# Patient Record
Sex: MALE | Race: WHITE | NOT HISPANIC OR LATINO | Employment: UNEMPLOYED | URBAN - METROPOLITAN AREA
[De-identification: names, ages, dates, MRNs, and addresses within clinical notes are randomized per-mention and may not be internally consistent; named-entity substitution may affect disease eponyms.]

---

## 2017-03-10 ENCOUNTER — ALLSCRIPTS OFFICE VISIT (OUTPATIENT)
Dept: OTHER | Facility: OTHER | Age: 47
End: 2017-03-10

## 2017-04-21 ENCOUNTER — ALLSCRIPTS OFFICE VISIT (OUTPATIENT)
Dept: OTHER | Facility: OTHER | Age: 47
End: 2017-04-21

## 2017-05-22 ENCOUNTER — ALLSCRIPTS OFFICE VISIT (OUTPATIENT)
Dept: OTHER | Facility: OTHER | Age: 47
End: 2017-05-22

## 2017-05-22 LAB — S PYO AG THROAT QL: NEGATIVE

## 2017-05-25 LAB
CULTURE RESULT (HISTORICAL): NORMAL
MISCELLANEOUS LAB TEST RESULT (HISTORICAL): NORMAL

## 2018-01-12 NOTE — PROGRESS NOTES
Assessment    1  Current some day smoker (305 1) (F17 200)   · Smokes 1pk of cigarettes a day  2  Hyperlipidemia (272 4) (E78 5)   3  Vitamin D deficiency (268 9) (E55 9)   4  Prediabetes (790 29) (R73 03)   5  Current smoker (305 1) (F17 200)    Plan  Hyperlipidemia    · Simvastatin 40 MG Oral Tablet (Zocor); TAKE ONE TABLET BY MOUTH AT  BEDTIME  Prediabetes    · (1) HEMOGLOBIN A1C; Status:Active; Requested for:10Mar2017;    · *VB - Foot Exam; Status:Complete;   Done: 29YWS0862 09:58AM    Discussion/Summary    1  hyperlipidemia - continue with Simvastatin daily, continue to work on diet, compliance encouraged  recheck 1 year    2  prediabetic - A1C down to 5 8% GREAT WORK!! Keep up the good work we will recheck this in 6 months, if normal will check yearly    3  vitamin d def - level normal, continue with supplement daily, repeat in 1 year    4  daily smoker - work on cutting back    f/u as needed  f/u 6 months with A1C, if normal will go back to yearly visits  Possible side effects of new medications were reviewed with the patient/guardian today  The treatment plan was reviewed with the patient/guardian  The patient/guardian understands and agrees with the treatment plan      Chief Complaint  Pt presents to the office for his 6 mo ck on PreDM, and lipids  BW done 02/27/17  foot due today  eye due, need referral      History of Present Illness  Patient here to review labs  Has made major changes in his life  New job, going to the gym, eating healthy cut out all white breads/pasta ect  Feeling very healthy and well  Still smoking but working on that also  No complaints  Compliant with Simvastatin  May have missed a few doses recently but otherwise no complaints  Review of Systems    Constitutional: No fever or chills, feels well, no tiredness, no recent weight gain or weight loss  Eyes: No complaints of eye pain, no red eyes, no discharge from eyes, no itchy eyes     ENT: no complaints of earache, no hearing loss, no nosebleeds, no nasal discharge, no sore throat, no hoarseness  Cardiovascular: No complaints of slow heart rate, no fast heart rate, no chest pain, no palpitations, no leg claudication, no lower extremity  Respiratory: No complaints of shortness of breath, no wheezing, no cough, no SOB on exertion, no orthopnea or PND  Gastrointestinal: No complaints of abdominal pain, no constipation, no nausea or vomiting, no diarrhea or bloody stools  Genitourinary: No complaints of dysuria, no incontinence, no hesitancy, no nocturia, no genital lesion, no testicular pain  Musculoskeletal: No complaints of arthralgia, no myalgias, no joint swelling or stiffness, no limb pain or swelling  Integumentary: No complaints of skin rash or skin lesions, no itching, no skin wound, no dry skin  Neurological: No compliants of headache, no confusion, no convulsions, no numbness or tingling, no dizziness or fainting, no limb weakness, no difficulty walking  Psychiatric: Is not suicidal, no sleep disturbances, no anxiety or depression, no change in personality, no emotional problems  Endocrine: No complaints of proptosis, no hot flashes, no muscle weakness, no erectile dysfunction, no deepening of the voice, no feelings of weakness  Hematologic/Lymphatic: No complaints of swollen glands, no swollen glands in the neck, does not bleed easily, no easy bruising  Active Problems    1  Current smoker (305 1) (F17 200)   2  Hyperlipidemia (272 4) (E78 5)   3  Need for prophylactic vaccination and inoculation against influenza (V04 81) (Z23)   4  Prediabetes (790 29) (R73 03)   5  Taking medication for chronic disease (799 9) (R69)   6  Umbilical hernia (583 8) (K42 9)   7  Vitamin D deficiency (268 9) (E55 9)    Past Medical History    1  History of Cough (786 2) (R05)   2  History of Foot Pain (Soft Tissue) (729 5)   3  History of acute bronchitis (V12 69) (Z87 09)   4   History of hiatal hernia (V12 79) (Z87 19)   5  Need for prophylactic vaccination and inoculation against influenza (V04 81) (Z23)   6  History of Onychomycosis of toenail (110 1) (B35 1)    The active problems and past medical history were reviewed and updated today  Surgical History    1  History of Knee Surgery    The surgical history was reviewed and updated today  Family History  Mother    1  Family history of Family Health Status Of Mother - Good  Father    2  Family history of Coronary artery disease   3  Family history of Father  At Age 40    The family history was reviewed and updated today  Social History    · Denied: History of Alcohol Use (History)   · Always uses seat belt   · Caffeine use (V49 89) (F15 90)   · Current smoker (305 1) (F17 200)   · Current some day smoker (305 1) (F17 200)   · Denied: History of Drug Use   · Has smoke detectors  The social history was reviewed and updated today  The social history was reviewed and is unchanged  Current Meds   1  Simvastatin 40 MG Oral Tablet; TAKE ONE TABLET BY MOUTH AT BEDTIME; Therapy: [id number] to (Evaluate:73Qbg9086)  Requested for: 21RYY9656; Last   Rx:[id number] Ordered   2  Vitamin D3 400 UNIT Oral Capsule; TAKE 1 CAPSULE DAILY; Therapy: (Recorded:50Mtp0571) to Recorded    The medication list was reviewed and updated today  Allergies    1  No Known Drug Allergies    2  No Known Environmental Allergies   3  No Known Food Allergies    Vitals  Vital Signs    Recorded: 92EYC9021 09:56AM   Heart Rate 84, R Radial   Pulse Quality Normal, R Radial   Respiration Quality Normal   Respiration 16   Systolic 180, RUE, Sitting   Diastolic 82, RUE, Sitting   Height 5 ft 9 in   Weight 203 lb 8 0 oz   BMI Calculated 30 05   BSA Calculated 2 08     Physical Exam    Constitutional   General appearance: No acute distress, well appearing and well nourished  Pulmonary   Respiratory effort: No increased work of breathing or signs of respiratory distress  Auscultation of lungs: Clear to auscultation, equal breath sounds bilaterally, no wheezes, no rales, no rhonci  Cardiovascular   Palpation of heart: Normal PMI, no thrills  Auscultation of heart: Normal rate and rhythm, normal S1 and S2, without murmurs  Examination of extremities for edema and/or varicosities: Normal     Carotid pulses: Normal     Psychiatric   Mood and affect: Normal         Socks and shoes removed, the Right Foot: the foot was normal, no swelling, no erythema  The toes on the right were normal    The sensory exam showed  normal vibratory sensation at the level of the toes on the right  Normal tactile sensation with monofilament testing throughout the right foot  Socks and shoes removed, Left Foot: the foot was normal, no swelling, no erythema  The toes on the left were normal    The sensory exam showed  normal vibratory sensation at the level of the toes on the left  Normal tactile sensation with monofilament testing throughout the left foot  Capillary refills findings on the right were normal in the toes  Pulses:   2+ in the posterior tibialis on the right   2+ in the dorsalis pedis on the right  Capillary refills findings on the left were normal in the toes  Pulses:   2+ in the posterior tibialis on the left   2+ in the dorsalis pedis on the left  Assign Risk Category: 0: No loss of protective sensation, no deformity  No present risk  Results/Data  PHQ-2 Adult Depression Screening [id number] 09:58AM User, Ahs     Test Name Result Flag Reference   PHQ-2 Adult Depression Score 0     Over the last two weeks, how often have you been bothered by any of the following problems?   Little interest or pleasure in doing things: Not at all - 0  Feeling down, depressed, or hopeless: Not at all - 0   PHQ-2 Adult Depression Screening Negative       *VB - Foot Exam 26MJY0549 09:58AM Brenna Valadez     Test Name Result Flag Reference   FOOT EXAM [id number]       [facility]) Lipid Panel [id number] 07: 17AM Rhoda Joaquin     Test Name Result Flag Reference   Cholesterol, Total 225 mg/dL H 100-199   Triglycerides 121 mg/dL  0-149   HDL Cholesterol 61 mg/dL  >39   VLDL Cholesterol [place] 24 mg/dL  5-40   LDL Cholesterol Calc 140 mg/dL H 0-99     (1) COMPREHENSIVE METABOLIC PANEL [id number] 64:11PR Rhoda Joaquin     Test Name Result Flag Reference   Glucose, Serum 99 mg/dL  65-99   BUN 15 mg/dL  6-24   Creatinine, Serum 0 92 mg/dL  0 76-1 27   eGFR If NonAfricn Am 101 mL/min/1 73  >59   eGFR If Africn Am 117 mL/min/1 73  >59   BUN/Creatinine Ratio 16  9-20   Sodium, Serum 141 mmol/L  134-144   Potassium, Serum 4 4 mmol/L  3 5-5 2   Chloride, Serum 102 mmol/L     Carbon Dioxide, Total 24 mmol/L  18-29   Calcium, Serum 8 8 mg/dL  8 7-10 2   Protein, Total, Serum 6 2 g/dL  6 0-8 5   Albumin, Serum 4 0 g/dL  3 5-5 5   Globulin, Total 2 2 g/dL  1 5-4 5   A/G Ratio 1 8  1 1-2 5   **Effective March 13, 2017 the reference interval**                   for A/G Ratio will be changing to:                               Age                Male          Female                           0 -  7 days       1 1 - 2 3       1 1 - 2 3                           8 - 30 days       1 2 - 2 8       1 2 - 2 8                           1 -  6 months     1 3 - 3 6       1 3 - 3 6                    7 months -  5 years      1 5 - 2 6       1 5 - 2 6                              > 5 years      1 2 - 2 2       1 2 - 2 2   Bilirubin, Total 0 3 mg/dL  0 0-1 2   Alkaline Phosphatase, S 75 IU/L     AST (SGOT) 17 IU/L  0-40   ALT (SGPT) 19 IU/L  0-44     (1) CBC/PLT/DIFF [id number] 07:17AM Rhoda Joaquin     Test Name Result Flag Reference   WBC 6 5 x10E3/uL  3 4-10 8   RBC 4 96 x10E6/uL  4 14-5 80   Hemoglobin 15 2 g/dL  12 6-17 7   Hematocrit 44 6 %  37 5-51 0   MCV 90 fL  79-97   MCH 30 6 pg  26 6-33 0   MCHC 34 1 g/dL  31 5-35 7   RDW 13 9 %  12 3-15 4   Platelets 957 H68G5/RK  150-379   Neutrophils 51 %     Lymphs 35 %     Monocytes 8 % Eos 6 %     Basos 0 %     Neutrophils (Absolute) 3 3 x10E3/uL  1 4-7 0   Lymphs (Absolute) 2 3 x10E3/uL  0 7-3 1   Monocytes(Absolute) 0 5 x10E3/uL  0 1-0 9   Eos (Absolute) 0 4 x10E3/uL  0 0-0 4   Baso (Absolute) 0 0 x10E3/uL  0 0-0 2   Immature Granulocytes 0 %     Immature Grans (Abs) 0 0 x10E3/uL  0 0-0 1     (LC) TSH reflex to T4F [id number] 07:17AM Jami Powell     Test Name Result Flag Reference   TSH 1 350 uIU/mL  0 450-4 500     (1) VITAMIN D 25-HYDROXY [id number] 07:17AM Jami Powell     Test Name Result Flag Reference   Vitamin D, 25-Hydroxy 39 5 ng/mL  30 0-100 0   Vitamin D deficiency has been defined by the [address] practice guideline as a  level of serum 25-OH vitamin D less than 20 ng/mL (1,2)  The Endocrine Society went on to further define vitamin D  insufficiency as a level between 21 and 29 ng/mL (2)  1  IOM ([place] [employer])  2010  Dietary reference     intakes for calcium and D  [facility]: The     [employer]  2  Claudia MF, Katia MAYFIELD, Tito BROWN, et al      Evaluation, treatment, and prevention of vitamin D     deficiency: an Endocrine Society clinical practice     guideline  JCEM  2011 Jul; 96(7):1911-30  (LC) Hgb A1c with eAG Estimation [id number] 07:17AM Jami Powell     Test Name Result Flag Reference   Hemoglobin A1c 5 8 % H 4 8-5 6   Pre-diabetes: 5 7 - 6 4           Diabetes: >6 4           Glycemic control for adults with diabetes: <7 0   Estim  Avg Glu (eAG) 120 mg/dL       [facility]) 25-Hydroxyvitamin D [facility] D2+D3 01Oct2015 07:20AM Jami Powell     Test Name Result Flag Reference   25-Hydroxy, Vitamin D 45 ng/mL     Reference Range:   All Ages: Target levels 30 - 100   25-Hydroxy, Vitamin D-2 <1 0 ng/mL     25-Hydroxy, Vitamin D-3 44 ng/mL       Future Appointments    Date/Time Provider Specialty Site   03/14/2018 03:00 PM Jami Barge, [address] Electronically signed by : Shirley Francois [facility]; Mar 10 2017 11:54AM EST                       (Author)    Electronically signed by : Shirley Francois [facility]; Mar 10 2017 11:55AM EST                       (Author)    Electronically signed by : VANE Maxwell ; Mar 10 2017 12:14PM EST                       (Author)

## 2018-01-13 VITALS
HEART RATE: 80 BPM | WEIGHT: 181 LBS | SYSTOLIC BLOOD PRESSURE: 102 MMHG | BODY MASS INDEX: 26.81 KG/M2 | TEMPERATURE: 99.5 F | HEIGHT: 69 IN | RESPIRATION RATE: 16 BRPM | DIASTOLIC BLOOD PRESSURE: 78 MMHG

## 2018-01-13 VITALS
RESPIRATION RATE: 14 BRPM | DIASTOLIC BLOOD PRESSURE: 64 MMHG | WEIGHT: 188.6 LBS | SYSTOLIC BLOOD PRESSURE: 118 MMHG | HEART RATE: 84 BPM | BODY MASS INDEX: 27.93 KG/M2 | HEIGHT: 69 IN

## 2018-01-15 VITALS
BODY MASS INDEX: 30.14 KG/M2 | WEIGHT: 203.5 LBS | HEIGHT: 69 IN | RESPIRATION RATE: 16 BRPM | HEART RATE: 84 BPM | DIASTOLIC BLOOD PRESSURE: 82 MMHG | SYSTOLIC BLOOD PRESSURE: 122 MMHG

## 2018-01-15 NOTE — PROGRESS NOTES
Assessment    1  Prediabetes (790 29) (R73 09)   2  Hyperlipidemia (272 4) (E78 5)    Plan  Hyperlipidemia    · (1) CBC/PLT/DIFF; Status:Active; Requested for:28Feb2017;    · (1) COMPREHENSIVE METABOLIC PANEL; Status:Active; Requested for:28Feb2017;    · ([facility]) Lipid Panel; Status:Active; Requested for:28Feb2017;    · (LC) TSH reflex to T4F; Status:Active; Requested for:28Feb2017;   Prediabetes    · (LC) Hgb A1c with eAG Estimation; Status:Active; Requested for:28Feb2017;   Vitamin D deficiency    · (1) VITAMIN D 25-HYDROXY; Status:Active; Requested for:28Feb2017;     Discussion/Summary    1  prediabetes - again discussed diet, advised if he does not make changes he will be taking medication for diabetes soon  Discussed diet at length with patient, giving him multiple options but patient is "too busy with life" to make these changed  Encouraegd to begin exercising again and trying to drop more than 12 lbs  Will repeat A1C in 6 months    2  hyperlipidemia - c/w simvastatin, recheck lipids in 6 months    f/u 6 months with labs   f/u as needed  Possible side effects of new medications were reviewed with the patient/guardian today  The treatment plan was reviewed with the patient/guardian  The patient/guardian understands and agrees with the treatment plan      Chief Complaint  pt here to review labs, dm, hyperlipidemia  History of Present Illness  Patient hre to review labs  Has really not made changes to diet, did not start exercising  Today ate a hashbrown and a "cherry pie"  Will go home and had "a big meal" for dinner and possibly a yogart before bed as he read that was "good for him"  Compliant with simvastatin  No change in diet, exercise  Review of Systems    Constitutional: No fever or chills, feels well, no tiredness, no recent weight gain or weight loss  Eyes: No complaints of eye pain, no red eyes, no discharge from eyes, no itchy eyes     ENT: no complaints of earache, no hearing loss, no nosebleeds, no nasal discharge, no sore throat, no hoarseness  Cardiovascular: No complaints of slow heart rate, no fast heart rate, no chest pain, no palpitations, no leg claudication, no lower extremity  Respiratory: No complaints of shortness of breath, no wheezing, no cough, no SOB on exertion, no orthopnea or PND  Gastrointestinal: No complaints of abdominal pain, no constipation, no nausea or vomiting, no diarrhea or bloody stools  Genitourinary: No complaints of dysuria, no incontinence, no hesitancy, no nocturia, no genital lesion, no testicular pain  Musculoskeletal: No complaints of arthralgia, no myalgias, no joint swelling or stiffness, no limb pain or swelling  Integumentary: No complaints of skin rash or skin lesions, no itching, no skin wound, no dry skin  Neurological: No compliants of headache, no confusion, no convulsions, no numbness or tingling, no dizziness or fainting, no limb weakness, no difficulty walking  Psychiatric: Is not suicidal, no sleep disturbances, no anxiety or depression, no change in personality, no emotional problems  Endocrine: No complaints of proptosis, no hot flashes, no muscle weakness, no erectile dysfunction, no deepening of the voice, no feelings of weakness  Hematologic/Lymphatic: No complaints of swollen glands, no swollen glands in the neck, does not bleed easily, no easy bruising  Active Problems    1  Current smoker (305 1) (F17 200)   2  Hyperlipidemia (272 4) (E78 5)   3  Need for prophylactic vaccination and inoculation against influenza (V04 81) (Z23)   4  Prediabetes (790 29) (R73 09)   5  Taking medication for chronic disease (799 9) (R69)   6  Umbilical hernia (666 3) (K42 9)   7  Vitamin D deficiency (268 9) (E55 9)    Past Medical History    1  History of Cough (786 2) (R05)   2  History of Foot Pain (Soft Tissue) (729 5)   3  History of acute bronchitis (V12 69) (Z87 09)   4  History of hiatal hernia (V12 79) (Z87 19)   5   Need for prophylactic vaccination and inoculation against influenza (V04 81) (Z23)   6  History of Onychomycosis of toenail (110 1) (B35 1)    Surgical History    1  History of Knee Surgery    The surgical history was reviewed and updated today  Family History  Mother    1  Family history of Family Health Status Of Mother - Good  Father    2  Family history of Coronary artery disease   3  Family history of Father  At Age 40    The family history was reviewed and updated today  Social History    · Denied: History of Alcohol Use (History)   · Always uses seat belt   · Caffeine use (V49 89) (F15 90)   · Current Every Day Smoker (305 1)   · Current smoker (305 1) (F17 200)   · Denied: History of Drug Use   · Has smoke detectors  The social history was reviewed and updated today  Current Meds   1  Simvastatin 40 MG Oral Tablet; TAKE ONE TABLET BY MOUTH AT BEDTIME; Therapy:  to ([place])  Requested for: 12QEQ2945; Last   Rx:[id number] Ordered   2  Vitamin D3 400 UNIT Oral Capsule; TAKE 1 CAPSULE DAILY; Therapy: (Recorded:2015) to Recorded    The medication list was reviewed and updated today  Allergies    1  No Known Drug Allergies    2  No Known Environmental Allergies   3  No Known Food Allergies    Vitals  Vital Signs    Recorded: 40XOT3705 35:84MH   Systolic 366, LUE, Sitting   Diastolic 66, LUE, Sitting   Heart Rate 72   Respiration 16   Height 5 ft 8 75 in   Weight 212 lb 13 oz   BMI Calculated 31 66   BSA Calculated 2 12     Physical Exam    Constitutional   General appearance: No acute distress, well appearing and well nourished  Pulmonary   Respiratory effort: No increased work of breathing or signs of respiratory distress  Auscultation of lungs: Clear to auscultation, equal breath sounds bilaterally, no wheezes, no rales, no rhonci  Cardiovascular   Palpation of heart: Normal PMI, no thrills      Auscultation of heart: Normal rate and rhythm, normal S1 and S2, without murmurs  Examination of extremities for edema and/or varicosities: Normal     Carotid pulses: Normal     Psychiatric   Mood and affect: Normal          Results/Data  hemoglobin 6 1% up from 6% in 10/2015        Future Appointments    Date/Time Provider Specialty Site   02/28/2017 04:15 PM Joshua Mott [facility] [address]     Signatures   Electronically signed by : Bj George [facility];  Aug 29 2016  8:05PM EST                       (Author)    Electronically signed by : VANE Sin ; Aug 30 2016 10:23AM EST                       (Author)

## 2018-03-09 DIAGNOSIS — E78.2 MIXED HYPERLIPIDEMIA: Primary | ICD-10-CM

## 2018-03-09 PROBLEM — F51.05 INSOMNIA DUE TO OTHER MENTAL DISORDER: Status: ACTIVE | Noted: 2017-04-21

## 2018-03-09 PROBLEM — F99 INSOMNIA DUE TO OTHER MENTAL DISORDER: Status: ACTIVE | Noted: 2017-04-21

## 2018-03-09 RX ORDER — SIMVASTATIN 40 MG
40 TABLET ORAL
Refills: 1 | COMMUNITY
Start: 2017-12-23 | End: 2018-05-10 | Stop reason: SINTOL

## 2018-03-09 RX ORDER — IBUPROFEN 800 MG
1 TABLET ORAL DAILY
COMMUNITY

## 2018-03-09 RX ORDER — TRAZODONE HYDROCHLORIDE 50 MG/1
1 TABLET ORAL
COMMUNITY
Start: 2017-04-21 | End: 2018-05-10 | Stop reason: ALTCHOICE

## 2018-03-09 RX ORDER — SIMVASTATIN 40 MG
TABLET ORAL
Qty: 30 TABLET | Refills: 0 | Status: SHIPPED | OUTPATIENT
Start: 2018-03-09 | End: 2018-05-05 | Stop reason: SDUPTHER

## 2018-03-09 NOTE — TELEPHONE ENCOUNTER
Patient last had labs 2/2017 for his cholesterol, he needs to get labs and schedule appt to discuss

## 2018-04-20 ENCOUNTER — TELEPHONE (OUTPATIENT)
Dept: FAMILY MEDICINE CLINIC | Facility: CLINIC | Age: 48
End: 2018-04-20

## 2018-04-20 NOTE — TELEPHONE ENCOUNTER
Patient called to say he lost his insurance  He received a call from his pharmacy saying they couldn't refill his meds before he had blood work done  He is concerned about the cost of blood work without insurance  How can we help him?     Best number for [facility]:  [phone]

## 2018-04-23 DIAGNOSIS — R73.9 HYPERGLYCEMIA: ICD-10-CM

## 2018-04-23 DIAGNOSIS — R53.83 FATIGUE, UNSPECIFIED TYPE: ICD-10-CM

## 2018-04-23 DIAGNOSIS — E78.5 HYPERLIPIDEMIA, UNSPECIFIED HYPERLIPIDEMIA TYPE: Primary | ICD-10-CM

## 2018-04-23 NOTE — TELEPHONE ENCOUNTER
Patient states that he will need a lab slip with our stamp on it that pt is uninsured  He will then get a discounted rate

## 2018-04-23 NOTE — TELEPHONE ENCOUNTER
Now pt states he would like his testosterone level checked also  Are you willing to order this as well?  Again [facility] will be [facility]

## 2018-04-30 LAB
ALBUMIN SERPL-MCNC: 4.5 G/DL (ref 3.6–5.1)
ALBUMIN/GLOB SERPL: 2.1 (CALC) (ref 1–2.5)
ALP SERPL-CCNC: 77 U/L (ref 40–115)
ALT SERPL-CCNC: 19 U/L (ref 9–46)
AST SERPL-CCNC: 18 U/L (ref 10–40)
BASOPHILS # BLD AUTO: 29 CELLS/UL (ref 0–200)
BASOPHILS NFR BLD AUTO: 0.5 %
BILIRUB SERPL-MCNC: 0.6 MG/DL (ref 0.2–1.2)
BUN SERPL-MCNC: 17 MG/DL (ref 7–25)
BUN/CREAT SERPL: NORMAL (CALC) (ref 6–22)
CALCIUM SERPL-MCNC: 9.6 MG/DL (ref 8.6–10.3)
CHLORIDE SERPL-SCNC: 103 MMOL/L (ref 98–110)
CHOLEST SERPL-MCNC: 261 MG/DL
CHOLEST/HDLC SERPL: 4.7 (CALC)
CO2 SERPL-SCNC: 29 MMOL/L (ref 20–31)
CREAT SERPL-MCNC: 0.98 MG/DL (ref 0.6–1.35)
EOSINOPHIL # BLD AUTO: 209 CELLS/UL (ref 15–500)
EOSINOPHIL NFR BLD AUTO: 3.6 %
ERYTHROCYTE [DISTWIDTH] IN BLOOD BY AUTOMATED COUNT: 12.9 % (ref 11–15)
EST. AVERAGE GLUCOSE BLD GHB EST-MCNC: 103 (CALC)
EST. AVERAGE GLUCOSE BLD GHB EST-SCNC: 5.7 (CALC)
GLOBULIN SER CALC-MCNC: 2.1 G/DL (CALC) (ref 1.9–3.7)
GLUCOSE SERPL-MCNC: 90 MG/DL (ref 65–99)
HBA1C MFR BLD: 5.2 % OF TOTAL HGB
HCT VFR BLD AUTO: 47.2 % (ref 38.5–50)
HDLC SERPL-MCNC: 56 MG/DL
HGB BLD-MCNC: 16.3 G/DL (ref 13.2–17.1)
LDLC SERPL CALC-MCNC: 185 MG/DL (CALC)
LYMPHOCYTES # BLD AUTO: 2036 CELLS/UL (ref 850–3900)
LYMPHOCYTES NFR BLD AUTO: 35.1 %
MCH RBC QN AUTO: 31.6 PG (ref 27–33)
MCHC RBC AUTO-ENTMCNC: 34.5 G/DL (ref 32–36)
MCV RBC AUTO: 91.5 FL (ref 80–100)
MONOCYTES # BLD AUTO: 458 CELLS/UL (ref 200–950)
MONOCYTES NFR BLD AUTO: 7.9 %
NEUTROPHILS # BLD AUTO: 3068 CELLS/UL (ref 1500–7800)
NEUTROPHILS NFR BLD AUTO: 52.9 %
NONHDLC SERPL-MCNC: 205 MG/DL (CALC)
PLATELET # BLD AUTO: 184 THOUSAND/UL (ref 140–400)
PMV BLD REES-ECKER: 11.7 FL (ref 7.5–12.5)
POTASSIUM SERPL-SCNC: 4.5 MMOL/L (ref 3.5–5.3)
PROT SERPL-MCNC: 6.6 G/DL (ref 6.1–8.1)
RBC # BLD AUTO: 5.16 MILLION/UL (ref 4.2–5.8)
SL AMB EGFR AFRICAN AMERICAN: 107 ML/MIN/1.73M2
SL AMB EGFR NON AFRICAN AMERICAN: 93 ML/MIN/1.73M2
SODIUM SERPL-SCNC: 138 MMOL/L (ref 135–146)
TESTOST SERPL-MCNC: 579 NG/DL (ref 250–827)
TRIGL SERPL-MCNC: 90 MG/DL
TSH SERPL-ACNC: 0.83 MIU/L (ref 0.4–4.5)
WBC # BLD AUTO: 5.8 THOUSAND/UL (ref 3.8–10.8)

## 2018-05-05 DIAGNOSIS — E78.2 MIXED HYPERLIPIDEMIA: ICD-10-CM

## 2018-05-07 RX ORDER — SIMVASTATIN 40 MG
TABLET ORAL
Qty: 30 TABLET | Refills: 0 | Status: SHIPPED | OUTPATIENT
Start: 2018-05-07 | End: 2018-05-10 | Stop reason: ALTCHOICE

## 2018-05-08 ENCOUNTER — TELEPHONE (OUTPATIENT)
Dept: FAMILY MEDICINE CLINIC | Facility: CLINIC | Age: 48
End: 2018-05-08

## 2018-05-08 NOTE — TELEPHONE ENCOUNTER
Patient has not been seen in over a year, he needs to schedule an appointment to discuss them  Thank you

## 2018-05-10 ENCOUNTER — OFFICE VISIT (OUTPATIENT)
Dept: FAMILY MEDICINE CLINIC | Facility: CLINIC | Age: 48
End: 2018-05-10

## 2018-05-10 VITALS
WEIGHT: 176.8 LBS | SYSTOLIC BLOOD PRESSURE: 118 MMHG | RESPIRATION RATE: 16 BRPM | DIASTOLIC BLOOD PRESSURE: 64 MMHG | HEART RATE: 60 BPM | BODY MASS INDEX: 26.19 KG/M2 | HEIGHT: 69 IN

## 2018-05-10 DIAGNOSIS — F17.200 CURRENT SMOKER: ICD-10-CM

## 2018-05-10 DIAGNOSIS — R73.03 PREDIABETES: ICD-10-CM

## 2018-05-10 DIAGNOSIS — E78.5 HYPERLIPIDEMIA, UNSPECIFIED HYPERLIPIDEMIA TYPE: Primary | ICD-10-CM

## 2018-05-10 PROCEDURE — 99212 OFFICE O/P EST SF 10 MIN: CPT | Performed by: PHYSICIAN ASSISTANT

## 2018-05-10 NOTE — PROGRESS NOTES
Assessment/Plan:    1  Hyperlipidemia, unspecified hyperlipidemia type    - patient having side effects to simvastatin, is self pay  Will call pharmacy and see what the cost is for rouvastatin or atorvastatin  He will call and let me know which to prescribed  He will take it for 3 months then get labs drawn   - Lipid Panel with Direct LDL reflex; Future  - Hepatic function panel; Future    2  Prediabetes    - A1C was 5 2% EXCELLENT!!! Continue your diet limited in carbs and sweets and regular exercise  3  Current smoker    - continue to work on quitting    f/u 3 months or sooner if needed      Subjective:   Chief Complaint   Patient presents with    Results     go over BW       Patient ID: Oscar Scruggs is a 41 y o  male  Patient here for routine, doing the intermittant fasting diet  Stopped simvastatin a few months ago, tried starting again and started with headaches and feeling "blah" stopped again and feeling better again  Has not taken 1 month  Since  wife has completely cleaned his diet, limited to no carbs use to be a pasta once a night nu, exercising 5 x a week  "In a real good place"  Has no complaints here to discuss labs  The following portions of the patient's history were reviewed and updated as appropriate: allergies, current medications, past family history, past medical history, past social history, past surgical history and problem list     History reviewed  No pertinent past medical history    Past Surgical History:   Procedure Laterality Date    KNEE SURGERY       Family History   Problem Relation Age of Onset    Thyroid cancer Mother     Lung cancer Mother     Coronary artery disease Father     Hyperlipidemia Sister     Hyperlipidemia Brother     Substance Abuse Neg Hx     Mental illness Neg Hx      Social History     Social History    Marital status: /Civil Union     Spouse name: N/A    Number of children: N/A    Years of education: N/A Occupational History    Not on file  Social History Main Topics    Smoking status: Current Every Day Smoker     Packs/day: 1 00     Years: 15 00     Types: Cigarettes    Smokeless tobacco: Never Used    Alcohol use Yes      Comment: Rare     Drug use: No    Sexual activity: Not on file     Other Topics Concern    Not on file     Social History Narrative    No narrative on file       Current Outpatient Prescriptions:     Cholecalciferol (VITAMIN D3) 400 units CAPS, Take 1 capsule by mouth daily, Disp: , Rfl:     simvastatin (ZOCOR) 40 mg tablet, Take 40 mg by mouth daily at bedtime, Disp: , Rfl: 1    Review of Systems   Constitutional: Negative  Eyes: Negative  Respiratory: Negative  Cardiovascular: Negative  Gastrointestinal: Negative  Genitourinary: Negative  Neurological: Negative  Objective:    Vitals:    05/10/18 1030   BP: 118/64   BP Location: Right arm   Patient Position: Sitting   Cuff Size: Adult   Pulse: 60   Resp: 16   Weight: 80 2 kg (176 lb 12 8 oz)   Height: 5' 9" (1 753 m)        Physical Exam   Constitutional: He is oriented to person, place, and time  He appears well-developed and well-nourished  Neck: Neck supple  Cardiovascular: Normal rate, regular rhythm, normal heart sounds and intact distal pulses  Pulmonary/Chest: Effort normal and breath sounds normal    Musculoskeletal: He exhibits no edema  Neurological: He is alert and oriented to person, place, and time  Skin: Skin is warm  Psychiatric: He has a normal mood and affect         Orders Only on 04/28/2018   Component Date Value Ref Range Status    Total Cholesterol 04/28/2018 261* <200 mg/dL Final    SL AMB HDL CHOLESTEROL 04/28/2018 56  >40 mg/dL Final    Triglycerides 04/28/2018 90  <150 mg/dL Final    SL AMB LDL-CHOLESTEROL 04/28/2018 185* mg/dL (calc) Final    Comment: Reference range: <100     Desirable range <100 mg/dL for primary prevention;    <70 mg/dL for patients with CHD or diabetic patients   with > or = 2 CHD risk factors  LDL-C is now calculated using the Alexys-Palafox   calculation, which is a validated novel method providing   better accuracy than the Friedewald equation in the   estimation of LDL-C  Eulogio Shannon  Lorene Chol  5953;474(35): 0512-0911   (http://[facility]/faq/SOF669)      SL AMB CHOL/HDLC RATIO 04/28/2018 4 7  <5 0 (calc) Final    SL AMB NON HDL CHOLESTEROL 04/28/2018 205* <130 mg/dL (calc) Final    Comment: For patients with diabetes plus 1 major ASCVD risk   factor, treating to a non-HDL-C goal of <100 mg/dL   (LDL-C of <70 mg/dL) is considered a therapeutic   option        SL AMB GLUCOSE 04/28/2018 90  65 - 99 mg/dL Final    Comment:               Fasting reference interval         BUN 04/28/2018 17  7 - 25 mg/dL Final    Creatinine, Serum 04/28/2018 0 98  0 60 - 1 35 mg/dL Final    eGFR Non  04/28/2018 93  > OR = 60 mL/min/1 73m2 Final    SL AMB EGFR  04/28/2018 107  > OR = 60 mL/min/1 73m2 Final    SL AMB BUN/CREATININE RATIO 70/93/5155 NOT APPLICABLE  6 - 22 (calc) Final    SL AMB SODIUM 04/28/2018 138  135 - 146 mmol/L Final    SL AMB POTASSIUM 04/28/2018 4 5  3 5 - 5 3 mmol/L Final    SL AMB CHLORIDE 04/28/2018 103  98 - 110 mmol/L Final    SL AMB CARBON DIOXIDE 04/28/2018 29  20 - 31 mmol/L Final    SL AMB CALCIUM 04/28/2018 9 6  8 6 - 10 3 mg/dL Final    SL AMB PROTEIN, TOTAL 04/28/2018 6 6  6 1 - 8 1 g/dL Final    Serum Albumin 04/28/2018 4 5  3 6 - 5 1 g/dL Final    SL AMB GLOBULIN 04/28/2018 2 1  1 9 - 3 7 g/dL (calc) Final    SL AMB ALBUMIN/GLOBULIN RATIO 04/28/2018 2 1  1 0 - 2 5 (calc) Final    SL AMB BILIRUBIN, TOTAL 04/28/2018 0 6  0 2 - 1 2 mg/dL Final    SL AMB ALKALINE PHOSPHATASE 04/28/2018 77  40 - 115 U/L Final    SL AMB AST 04/28/2018 18  10 - 40 U/L Final    SL AMB ALT 04/28/2018 19  9 - 46 U/L Final    SL AMB LAB WHITE BLOOD CELL COUNT 04/28/2018 5 8 3 8 - 10 8 Thousand/uL Final     AMB LAB RED BLOOD CELLS 04/28/2018 5 16  4 20 - 5 80 Million/uL Final    Hemoglobin 04/28/2018 16 3  13 2 - 17 1 g/dL Final    Hematocrit 04/28/2018 47 2  38 5 - 50 0 % Final    MCV 04/28/2018 91 5  80 0 - 100 0 fL Final    MCH 04/28/2018 31 6  27 0 - 33 0 pg Final    MCHC 04/28/2018 34 5  32 0 - 36 0 g/dL Final    RDW 04/28/2018 12 9  11 0 - 15 0 % Final    Platelet Count 40/09/0100 184  140 - 400 Thousand/uL Final    SL AMB MPV 04/28/2018 11 7  7 5 - 12 5 fL Final    Neutrophils (Absolute) 04/28/2018 3068  1,500 - 7,800 cells/uL Final    Lymphocytes (Absolute) 04/28/2018 2036  850 - 3,900 cells/uL Final    Monocytes (Absolute) 04/28/2018 458  200 - 950 cells/uL Final    Eosinophils (Absolute) 04/28/2018 209  15 - 500 cells/uL Final    Basophils (Absolute) 04/28/2018 29  0 - 200 cells/uL Final    Neutrophils 04/28/2018 52 9  % Final    Lymphocytes 04/28/2018 35 1  % Final    Monocytes 04/28/2018 7 9  % Final    Eosinophils 04/28/2018 3 6  % Final    Basophils 04/28/2018 0 5  % Final    TESTOSTERONE TOTAL 04/28/2018 579  250 - 827 ng/dL Final    SL AMB TSH W/ REFLEX TO FREE T4 04/28/2018 0 83  0 40 - 4 50 mIU/L Final    Hemoglobin A1C 04/28/2018 5 2  <5 7 % of total Hgb Final    Comment: For the purpose of screening for the presence of  diabetes:     <5 7%       Consistent with the absence of diabetes  5 7-6 4%    Consistent with increased risk for diabetes              (prediabetes)  > or =6 5%  Consistent with diabetes     This assay result is consistent with a decreased risk  of diabetes  Currently, no consensus exists regarding use of  hemoglobin A1c for diagnosis of diabetes in children  According to American Diabetes Association (ADA)  guidelines, hemoglobin A1c <7 0% represents optimal  control in non-pregnant diabetic patients  Different  metrics may apply to specific patient populations  Standards of Medical Care in Diabetes(ADA)            Estimated Average Glucose 04/28/2018 103  (calc) Final    Estimated Average Glucose (mmol/L) 04/28/2018 5 7  (calc) Final   ]

## 2018-06-12 DIAGNOSIS — E78.2 MIXED HYPERLIPIDEMIA: ICD-10-CM

## 2018-06-12 RX ORDER — SIMVASTATIN 40 MG
TABLET ORAL
Qty: 30 TABLET | Refills: 0 | OUTPATIENT
Start: 2018-06-12

## 2018-06-12 NOTE — TELEPHONE ENCOUNTER
I had asked Jenni Brower to look into trying Lipitor or Crestor, did he get prices for those and would he like to switch since he did not like side effects of Simvastatin?

## 2018-06-14 DIAGNOSIS — E78.5 HYPERLIPIDEMIA, UNSPECIFIED HYPERLIPIDEMIA TYPE: Primary | ICD-10-CM

## 2018-06-14 RX ORDER — SIMVASTATIN 40 MG
40 TABLET ORAL
Qty: 90 TABLET | Refills: 1 | Status: SHIPPED | OUTPATIENT
Start: 2018-06-14 | End: 2021-11-17 | Stop reason: ALTCHOICE

## 2018-06-14 NOTE — TELEPHONE ENCOUNTER
Finally got a hold of the patient, he said he may have been having a bad week with allergies and now thinks this was the cause of him not feeling good on the Simvastatin  So he has since been feeling good on it and is not going to switch   I am going to mail his lab slip to him

## 2019-09-08 DIAGNOSIS — E78.5 HYPERLIPIDEMIA, UNSPECIFIED HYPERLIPIDEMIA TYPE: ICD-10-CM

## 2019-09-10 DIAGNOSIS — R69 TAKING MEDICATION FOR CHRONIC DISEASE: ICD-10-CM

## 2019-09-10 DIAGNOSIS — E78.5 HYPERLIPIDEMIA, UNSPECIFIED HYPERLIPIDEMIA TYPE: Primary | ICD-10-CM

## 2019-09-10 DIAGNOSIS — R73.03 PREDIABETES: ICD-10-CM

## 2019-09-10 RX ORDER — SIMVASTATIN 40 MG
40 TABLET ORAL
Qty: 90 TABLET | Refills: 1 | OUTPATIENT
Start: 2019-09-10

## 2021-04-22 ENCOUNTER — OFFICE VISIT (OUTPATIENT)
Dept: FAMILY MEDICINE CLINIC | Facility: CLINIC | Age: 51
End: 2021-04-22
Payer: COMMERCIAL

## 2021-04-22 VITALS
RESPIRATION RATE: 16 BRPM | BODY MASS INDEX: 29.64 KG/M2 | WEIGHT: 200.1 LBS | HEART RATE: 76 BPM | SYSTOLIC BLOOD PRESSURE: 128 MMHG | HEIGHT: 69 IN | DIASTOLIC BLOOD PRESSURE: 70 MMHG | TEMPERATURE: 98.9 F

## 2021-04-22 DIAGNOSIS — J03.90 EXUDATIVE TONSILLITIS: Primary | ICD-10-CM

## 2021-04-22 DIAGNOSIS — N48.89 PENIS PAIN: ICD-10-CM

## 2021-04-22 DIAGNOSIS — E78.5 HYPERLIPIDEMIA, UNSPECIFIED HYPERLIPIDEMIA TYPE: ICD-10-CM

## 2021-04-22 DIAGNOSIS — R73.9 HYPERGLYCEMIA: ICD-10-CM

## 2021-04-22 DIAGNOSIS — K42.9 UMBILICAL HERNIA WITHOUT OBSTRUCTION AND WITHOUT GANGRENE: Primary | ICD-10-CM

## 2021-04-22 DIAGNOSIS — K42.9 UMBILICAL HERNIA WITHOUT OBSTRUCTION AND WITHOUT GANGRENE: ICD-10-CM

## 2021-04-22 DIAGNOSIS — N52.9 ERECTILE DYSFUNCTION, UNSPECIFIED ERECTILE DYSFUNCTION TYPE: ICD-10-CM

## 2021-04-22 LAB
S PYO AG THROAT QL: NEGATIVE
SL AMB  POCT GLUCOSE, UA: NORMAL
SL AMB LEUKOCYTE ESTERASE,UA: NORMAL
SL AMB POCT BILIRUBIN,UA: NORMAL
SL AMB POCT BLOOD,UA: NORMAL
SL AMB POCT CLARITY,UA: CLEAR
SL AMB POCT COLOR,UA: YELLOW
SL AMB POCT KETONES,UA: NORMAL
SL AMB POCT NITRITE,UA: NORMAL
SL AMB POCT PH,UA: 5
SL AMB POCT SPECIFIC GRAVITY,UA: 1
SL AMB POCT URINE PROTEIN: NORMAL
SL AMB POCT UROBILINOGEN: 0.2

## 2021-04-22 PROCEDURE — 87880 STREP A ASSAY W/OPTIC: CPT | Performed by: PHYSICIAN ASSISTANT

## 2021-04-22 PROCEDURE — 3725F SCREEN DEPRESSION PERFORMED: CPT | Performed by: PHYSICIAN ASSISTANT

## 2021-04-22 PROCEDURE — 99214 OFFICE O/P EST MOD 30 MIN: CPT | Performed by: PHYSICIAN ASSISTANT

## 2021-04-22 PROCEDURE — 81002 URINALYSIS NONAUTO W/O SCOPE: CPT | Performed by: PHYSICIAN ASSISTANT

## 2021-04-22 PROCEDURE — 3008F BODY MASS INDEX DOCD: CPT | Performed by: PHYSICIAN ASSISTANT

## 2021-04-22 RX ORDER — AMOXICILLIN 875 MG/1
875 TABLET, COATED ORAL 2 TIMES DAILY
Qty: 20 TABLET | Refills: 0 | Status: SHIPPED | OUTPATIENT
Start: 2021-04-22 | End: 2021-05-02

## 2021-04-22 NOTE — PROGRESS NOTES
Assessment/Plan:    1  Exudative tonsillitis    - will treat with amoxil, send culture out to confirm, gargle with warm salt water, fluids, rest  - POCT rapid strepA  - amoxicillin (AMOXIL) 875 mg tablet; Take 1 tablet (875 mg total) by mouth 2 (two) times a day for 10 days  Dispense: 20 tablet; Refill: 0    2  Umbilical hernia without obstruction and without gangrene    - patient unhappy with appearance, will refer to surgery  - Ambulatory referral to General Surgery; Future    3  Penis pain    - penis tender to palpation, feels in area of vein? Phlebitis? Will ultrasound to confirm  - US scrotum and testicles; Future  - urine normal    4  Hyperlipidemia, unspecified hyperlipidemia type    - currently off statin for 2 years, will check labs now  - Lipid Panel with Direct LDL reflex; Future  - Comprehensive metabolic panel; Future  - CBC and differential; Future  - TSH, 3rd generation with Free T4 reflex; Future    5  Hyperglycemia    - has gained weight, not particularly watching diet, will check FBS  - Comprehensive metabolic panel; Future    6  BMI 29 0-29 9,adult    - encouraged patient to work on [employer], exercise  and adequate sleep for weight loss  Follow up if more help is needed    7  ED    - will check testosterone level now    F/u as needed    Subjective:   Chief Complaint   Patient presents with    Sore Throat      Patient ID: Molly Harper is a 52 y o  male  Has an umbilical hernia that feels it is affecting the way his abdominal muscles are  Cannot get them fit no matter how much he works out  Has swollen red tonsils with white spots for two days  Very sore, denies fever, chills "must be strep"  Getting no better  Two days ago penis started hurting, slowly going down the penis  Increase pain with morning erection  Erections have not been the same regardless, touch achieving and maintaining "just not the same"         The following portions of the patient's history were reviewed and updated as appropriate: allergies, current medications, past family history, past medical history, past social history, past surgical history and problem list     Past Medical History:   Diagnosis Date    Hiatal hernia     Onychomycosis of toenail      Past Surgical History:   Procedure Laterality Date    KNEE SURGERY       Family History   Problem Relation Age of Onset    Thyroid cancer Mother     Lung cancer Mother     Coronary artery disease Father     Hyperlipidemia Sister     Hyperlipidemia Brother     Substance Abuse Neg Hx     Mental illness Neg Hx      Social History     Socioeconomic History    Marital status: /Civil Union     Spouse name: Not on file    Number of children: Not on file    Years of education: Not on file    Highest education level: Not on file   Occupational History    Not on file   Social Needs    Financial resource strain: Not on file    Food insecurity     Worry: Not on file     Inability: Not on file    Transportation needs     Medical: Not on file     Non-medical: Not on file   Tobacco Use    Smoking status: Current Every Day Smoker     Packs/day: 1 00     Years: 15 00     Pack years: 15 00     Types: Cigarettes    Smokeless tobacco: Never Used   Substance and Sexual Activity    Alcohol use: Yes     Comment: Rare     Drug use: No    Sexual activity: Not on file   Lifestyle    Physical activity     Days per week: Not on file     Minutes per session: Not on file    Stress: Not on file   Relationships    Social connections     Talks on phone: Not on file     Gets together: Not on file     Attends Rest[employer] service: Not on file     Active member of club or organization: Not on file     Attends meetings of clubs or organizations: Not on file     Relationship status: Not on file    Intimate partner violence     Fear of current or ex partner: Not on file     Emotionally abused: Not on file     Physically abused: Not on file     Forced sexual activity: Not on file   Other Topics Concern    Not on file   Social History Narrative    Always uses a seatbelt    Caffeine use- 1 Cup of coffee per day    Has smoke detectors       Current Outpatient Medications:     Cholecalciferol (VITAMIN D3) 400 units CAPS, Take 1 capsule by mouth daily, Disp: , Rfl:     simvastatin (ZOCOR) 40 mg tablet, Take 1 tablet (40 mg total) by mouth daily at bedtime, Disp: 90 tablet, Rfl: 1    Review of Systems          Objective:    Vitals:    04/22/21 1150   BP: 128/70   BP Location: Left arm   Patient Position: Sitting   Cuff Size: Standard   Pulse: 76   Resp: 16   Temp: 98 9 °F (37 2 °C)   TempSrc: Oral   Weight: 90 8 kg (200 lb 1 6 oz)   Height: 5' 8 75" (1 746 m)        Physical Exam  Constitutional:       Appearance: He is well-developed  HENT:      Head: Normocephalic and atraumatic  Right Ear: Tympanic membrane and ear canal normal       Left Ear: Tympanic membrane and ear canal normal       Mouth/Throat:      Mouth: Mucous membranes are moist       Tonsils: Tonsillar exudate present  2+ on the right  2+ on the left  Neck:      Musculoskeletal: Neck supple  Cardiovascular:      Rate and Rhythm: Normal rate and regular rhythm  Heart sounds: Normal heart sounds  Pulmonary:      Effort: Pulmonary effort is normal       Breath sounds: Normal breath sounds  Abdominal:      General: Bowel sounds are normal       Palpations: Abdomen is soft  Hernia: A hernia is present  Comments: Easily reducible umbilical hernia    Genitalia:    Penis dorsum palpable cord, per patient tender   Skin:     General: Skin is warm  Neurological:      General: No focal deficit present  Mental Status: He is alert and oriented to person, place, and time  Psychiatric:         Mood and Affect: Mood normal          Behavior: Behavior normal            BMI Counseling: Body mass index is 29 76 kg/m²  The BMI is above normal  Nutrition recommendations include reducing portion sizes

## 2021-04-23 LAB
ALBUMIN SERPL-MCNC: 4.4 G/DL (ref 3.6–5.1)
ALBUMIN/GLOB SERPL: 1.8 (CALC) (ref 1–2.5)
ALP SERPL-CCNC: 89 U/L (ref 36–130)
ALT SERPL-CCNC: 21 U/L (ref 9–46)
AST SERPL-CCNC: 23 U/L (ref 10–40)
BASOPHILS # BLD AUTO: 51 CELLS/UL (ref 0–200)
BASOPHILS NFR BLD AUTO: 0.5 %
BILIRUB SERPL-MCNC: 0.4 MG/DL (ref 0.2–1.2)
BUN SERPL-MCNC: 16 MG/DL (ref 7–25)
BUN/CREAT SERPL: NORMAL (CALC) (ref 6–22)
CALCIUM SERPL-MCNC: 9.4 MG/DL (ref 8.6–10.3)
CHLORIDE SERPL-SCNC: 102 MMOL/L (ref 98–110)
CHOLEST SERPL-MCNC: 266 MG/DL
CHOLEST/HDLC SERPL: 4.4 (CALC)
CO2 SERPL-SCNC: 27 MMOL/L (ref 20–32)
CREAT SERPL-MCNC: 0.86 MG/DL (ref 0.6–1.35)
EOSINOPHIL # BLD AUTO: 173 CELLS/UL (ref 15–500)
EOSINOPHIL NFR BLD AUTO: 1.7 %
ERYTHROCYTE [DISTWIDTH] IN BLOOD BY AUTOMATED COUNT: 12.1 % (ref 11–15)
GLOBULIN SER CALC-MCNC: 2.5 G/DL (CALC) (ref 1.9–3.7)
GLUCOSE SERPL-MCNC: 93 MG/DL (ref 65–99)
HCT VFR BLD AUTO: 47.6 % (ref 38.5–50)
HDLC SERPL-MCNC: 60 MG/DL
HGB BLD-MCNC: 16.6 G/DL (ref 13.2–17.1)
LDLC SERPL CALC-MCNC: 183 MG/DL (CALC)
LYMPHOCYTES # BLD AUTO: 2111 CELLS/UL (ref 850–3900)
LYMPHOCYTES NFR BLD AUTO: 20.7 %
MCH RBC QN AUTO: 31.5 PG (ref 27–33)
MCHC RBC AUTO-ENTMCNC: 34.9 G/DL (ref 32–36)
MCV RBC AUTO: 90.3 FL (ref 80–100)
MONOCYTES # BLD AUTO: 714 CELLS/UL (ref 200–950)
MONOCYTES NFR BLD AUTO: 7 %
NEUTROPHILS # BLD AUTO: 7150 CELLS/UL (ref 1500–7800)
NEUTROPHILS NFR BLD AUTO: 70.1 %
NONHDLC SERPL-MCNC: 206 MG/DL (CALC)
PLATELET # BLD AUTO: 220 THOUSAND/UL (ref 140–400)
PMV BLD REES-ECKER: 11 FL (ref 7.5–12.5)
POTASSIUM SERPL-SCNC: 4.4 MMOL/L (ref 3.5–5.3)
PROT SERPL-MCNC: 6.9 G/DL (ref 6.1–8.1)
RBC # BLD AUTO: 5.27 MILLION/UL (ref 4.2–5.8)
SL AMB EGFR AFRICAN AMERICAN: 119 ML/MIN/1.73M2
SL AMB EGFR NON AFRICAN AMERICAN: 103 ML/MIN/1.73M2
SODIUM SERPL-SCNC: 137 MMOL/L (ref 135–146)
TESTOST SERPL-MCNC: 726 NG/DL (ref 250–827)
TRIGL SERPL-MCNC: 107 MG/DL
TSH SERPL-ACNC: 1.13 MIU/L (ref 0.4–4.5)
WBC # BLD AUTO: 10.2 THOUSAND/UL (ref 3.8–10.8)

## 2021-04-26 ENCOUNTER — TELEPHONE (OUTPATIENT)
Dept: FAMILY MEDICINE CLINIC | Facility: CLINIC | Age: 51
End: 2021-04-26

## 2021-04-26 DIAGNOSIS — E78.5 HYPERLIPIDEMIA, UNSPECIFIED HYPERLIPIDEMIA TYPE: Primary | ICD-10-CM

## 2021-04-26 RX ORDER — ATORVASTATIN CALCIUM 10 MG/1
10 TABLET, FILM COATED ORAL DAILY
Qty: 30 TABLET | Refills: 4 | Status: SHIPPED | OUTPATIENT
Start: 2021-04-26 | End: 2021-11-01

## 2021-04-26 NOTE — TELEPHONE ENCOUNTER
Patient needs Kamla Shah to fill out a work form. Once it is done please let patient know in order for him to pick it up.  Left letter back in peds triage.      [facility] was [facility]   MRP

## 2021-04-26 NOTE — TELEPHONE ENCOUNTER
What we will do is have him start a low dose statin and repeat his lab values in 3 months including liver function tests  We continue to check the liver every 6 months

## 2021-04-26 NOTE — TELEPHONE ENCOUNTER
Patient was informed  He states that he is willing to go on a statin but he is concerned about the affect it will have on his liver  Please advise @ [phone]   MRP

## 2021-04-26 NOTE — TELEPHONE ENCOUNTER
----- Message from Alfornia Dancer, PA-C sent at 4/23/2021  8:23 PM EDT -----  Please let patient know his labs are normal except his cholesterol is high  He would benefit from being back on a statin  Is he will to do this?

## 2021-04-28 ENCOUNTER — HOSPITAL ENCOUNTER (OUTPATIENT)
Dept: ULTRASOUND IMAGING | Facility: HOSPITAL | Age: 51
Discharge: HOME | End: 2021-04-28
Payer: COMMERCIAL

## 2021-04-28 DIAGNOSIS — N48.89 PENIS PAIN: ICD-10-CM

## 2021-04-28 PROCEDURE — 76870 US EXAM SCROTUM: CPT

## 2021-04-29 ENCOUNTER — TELEPHONE (OUTPATIENT)
Dept: UROLOGY | Facility: AMBULATORY SURGERY CENTER | Age: 51
End: 2021-04-29

## 2021-04-29 ENCOUNTER — TELEPHONE (OUTPATIENT)
Dept: FAMILY MEDICINE CLINIC | Facility: CLINIC | Age: 51
End: 2021-04-29

## 2021-04-29 DIAGNOSIS — N52.9 ERECTILE DYSFUNCTION, UNSPECIFIED ERECTILE DYSFUNCTION TYPE: ICD-10-CM

## 2021-04-29 DIAGNOSIS — I80.8 MONDOR'S DISEASE: Primary | ICD-10-CM

## 2021-04-29 NOTE — TELEPHONE ENCOUNTER
----- Message from Mala Tomas PA-C sent at 4/29/2021  9:32 AM EDT -----  Refer to urology - please call with phone number for patient and leave number on machine if he does not answer, he is working

## 2021-04-29 NOTE — TELEPHONE ENCOUNTER
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

## 2021-05-18 ENCOUNTER — CONSULT (OUTPATIENT)
Dept: SURGERY | Facility: CLINIC | Age: 51
End: 2021-05-18
Payer: COMMERCIAL

## 2021-05-18 VITALS
HEIGHT: 68 IN | BODY MASS INDEX: 30.1 KG/M2 | SYSTOLIC BLOOD PRESSURE: 141 MMHG | DIASTOLIC BLOOD PRESSURE: 78 MMHG | RESPIRATION RATE: 16 BRPM | HEART RATE: 71 BPM | TEMPERATURE: 98.1 F | WEIGHT: 198.6 LBS

## 2021-05-18 DIAGNOSIS — K42.9 UMBILICAL HERNIA WITHOUT OBSTRUCTION AND WITHOUT GANGRENE: ICD-10-CM

## 2021-05-18 PROCEDURE — 99243 OFF/OP CNSLTJ NEW/EST LOW 30: CPT | Performed by: SURGERY

## 2021-05-28 PROBLEM — K42.9 UMBILICAL HERNIA WITHOUT OBSTRUCTION AND WITHOUT GANGRENE: Status: ACTIVE | Noted: 2021-05-28

## 2021-05-28 NOTE — ASSESSMENT & PLAN NOTE
He has a small fat containing umbilical hernia  Discussed with him options for repair  The size is relatively small and wound may not need mesh at this time  However I do not believe it is a source of any of his discomfort  This point he wants to do conservative management  Follow-up p r n

## 2021-05-28 NOTE — PROGRESS NOTES
Assessment/Plan:    Umbilical hernia without obstruction and without gangrene  He has a small fat containing umbilical hernia  Discussed with him options for repair  The size is relatively small and wound may not need mesh at this time  However I do not believe it is a source of any of his discomfort  This point he wants to do conservative management  Follow-up p r n  Diagnoses and all orders for this visit:    Umbilical hernia without obstruction and without gangrene  -     Ambulatory referral to General Surgery          Subjective:      Patient ID: Efrain Wills is a 52 y o  male  Mr Saul Orozco is a 51-year-old gentleman that is here for evaluation umbilical hernia  Patient is had for long time  He has been having some lower pelvic and penile pain is concerned could this be related  Denies nausea vomiting fevers or chills      The following portions of the patient's history were reviewed and updated as appropriate: allergies, current medications, past family history, past medical history, past social history, past surgical history and problem list     Review of Systems   Constitutional: Negative for chills and fever  HENT: Negative for ear pain and sore throat  Eyes: Negative for pain and visual disturbance  Respiratory: Negative for cough and shortness of breath  Cardiovascular: Negative for chest pain and palpitations  Gastrointestinal: Negative for abdominal pain and vomiting  Genitourinary: Positive for penile pain  Negative for dysuria and hematuria  Musculoskeletal: Negative for arthralgias and back pain  Skin: Negative for color change and rash  Neurological: Negative for seizures and syncope  Psychiatric/Behavioral: Negative for agitation and behavioral problems  All other systems reviewed and are negative          Objective:      /78   Pulse 71   Temp 98 1 °F (36 7 °C)   Resp 16   Ht 5' 8" (1 727 m)   Wt 90 1 kg (198 lb 9 6 oz)   BMI 30 20 kg/m² Physical Exam  Vitals signs and nursing note reviewed  Constitutional:       General: He is not in acute distress  Appearance: He is well-developed  He is not diaphoretic  HENT:      Head: Normocephalic and atraumatic  Eyes:      Pupils: Pupils are equal, round, and reactive to light  Neck:      Musculoskeletal: Normal range of motion and neck supple  Cardiovascular:      Rate and Rhythm: Normal rate and regular rhythm  Heart sounds: Normal heart sounds  No murmur  Pulmonary:      Effort: Pulmonary effort is normal  No respiratory distress  Breath sounds: Normal breath sounds  No wheezing  Abdominal:      General: Bowel sounds are normal       Palpations: Abdomen is soft  Hernia: A hernia is present  Comments: Small reducible fat containing umbilical hernia  Musculoskeletal: Normal range of motion  Skin:     General: Skin is warm and dry  Neurological:      Mental Status: He is alert and oriented to person, place, and time     Psychiatric:         Behavior: Behavior normal

## 2021-06-04 ENCOUNTER — OFFICE VISIT (OUTPATIENT)
Dept: UROLOGY | Facility: HOSPITAL | Age: 51
End: 2021-06-04
Payer: COMMERCIAL

## 2021-06-04 VITALS
BODY MASS INDEX: 29.33 KG/M2 | SYSTOLIC BLOOD PRESSURE: 134 MMHG | HEIGHT: 69 IN | WEIGHT: 198 LBS | DIASTOLIC BLOOD PRESSURE: 76 MMHG

## 2021-06-04 DIAGNOSIS — N52.9 ERECTILE DYSFUNCTION, UNSPECIFIED ERECTILE DYSFUNCTION TYPE: ICD-10-CM

## 2021-06-04 DIAGNOSIS — I80.8 MONDOR'S DISEASE: ICD-10-CM

## 2021-06-04 PROCEDURE — 3008F BODY MASS INDEX DOCD: CPT | Performed by: UROLOGY

## 2021-06-04 PROCEDURE — 99204 OFFICE O/P NEW MOD 45 MIN: CPT | Performed by: UROLOGY

## 2021-10-30 DIAGNOSIS — E78.5 HYPERLIPIDEMIA, UNSPECIFIED HYPERLIPIDEMIA TYPE: ICD-10-CM

## 2021-11-01 RX ORDER — ATORVASTATIN CALCIUM 10 MG/1
TABLET, FILM COATED ORAL
Qty: 30 TABLET | Refills: 4 | Status: SHIPPED | OUTPATIENT
Start: 2021-11-01 | End: 2022-05-17

## 2021-11-12 ENCOUNTER — TELEPHONE (OUTPATIENT)
Dept: FAMILY MEDICINE CLINIC | Facility: CLINIC | Age: 51
End: 2021-11-12

## 2021-11-17 ENCOUNTER — HOSPITAL ENCOUNTER (OUTPATIENT)
Dept: RADIOLOGY | Facility: HOSPITAL | Age: 51
Discharge: HOME | End: 2021-11-17
Payer: COMMERCIAL

## 2021-11-17 ENCOUNTER — OFFICE VISIT (OUTPATIENT)
Dept: FAMILY MEDICINE CLINIC | Facility: CLINIC | Age: 51
End: 2021-11-17
Payer: COMMERCIAL

## 2021-11-17 VITALS
RESPIRATION RATE: 16 BRPM | DIASTOLIC BLOOD PRESSURE: 70 MMHG | WEIGHT: 206.3 LBS | HEART RATE: 90 BPM | BODY MASS INDEX: 30.56 KG/M2 | SYSTOLIC BLOOD PRESSURE: 112 MMHG | HEIGHT: 69 IN

## 2021-11-17 DIAGNOSIS — M54.16 LUMBAR BACK PAIN WITH RADICULOPATHY AFFECTING LOWER EXTREMITY: ICD-10-CM

## 2021-11-17 DIAGNOSIS — M54.16 LUMBAR BACK PAIN WITH RADICULOPATHY AFFECTING LOWER EXTREMITY: Primary | ICD-10-CM

## 2021-11-17 PROCEDURE — 3008F BODY MASS INDEX DOCD: CPT | Performed by: PHYSICIAN ASSISTANT

## 2021-11-17 PROCEDURE — 72110 X-RAY EXAM L-2 SPINE 4/>VWS: CPT

## 2021-11-17 PROCEDURE — 3725F SCREEN DEPRESSION PERFORMED: CPT | Performed by: PHYSICIAN ASSISTANT

## 2021-11-17 PROCEDURE — 99213 OFFICE O/P EST LOW 20 MIN: CPT | Performed by: PHYSICIAN ASSISTANT

## 2021-11-22 ENCOUNTER — TELEPHONE (OUTPATIENT)
Dept: FAMILY MEDICINE CLINIC | Facility: CLINIC | Age: 51
End: 2021-11-22

## 2021-11-23 ENCOUNTER — TELEPHONE (OUTPATIENT)
Dept: FAMILY MEDICINE CLINIC | Facility: CLINIC | Age: 51
End: 2021-11-23

## 2022-01-17 ENCOUNTER — OFFICE VISIT (OUTPATIENT)
Dept: URGENT CARE | Facility: CLINIC | Age: 52
End: 2022-01-17
Payer: COMMERCIAL

## 2022-01-17 VITALS
HEIGHT: 68 IN | BODY MASS INDEX: 31.52 KG/M2 | RESPIRATION RATE: 18 BRPM | SYSTOLIC BLOOD PRESSURE: 138 MMHG | OXYGEN SATURATION: 96 % | WEIGHT: 208 LBS | TEMPERATURE: 97 F | DIASTOLIC BLOOD PRESSURE: 74 MMHG | HEART RATE: 81 BPM

## 2022-01-17 DIAGNOSIS — S05.01XA ABRASION OF RIGHT CORNEA, INITIAL ENCOUNTER: Primary | ICD-10-CM

## 2022-01-17 PROCEDURE — 99213 OFFICE O/P EST LOW 20 MIN: CPT | Performed by: PREVENTIVE MEDICINE

## 2022-01-17 RX ORDER — TETRACAINE HYDROCHLORIDE 5 MG/ML
2 SOLUTION OPHTHALMIC ONCE
Status: COMPLETED | OUTPATIENT
Start: 2022-01-17 | End: 2022-01-17

## 2022-01-17 RX ADMIN — TETRACAINE HYDROCHLORIDE 2 DROP: 5 SOLUTION OPHTHALMIC at 19:58

## 2022-01-18 NOTE — PROGRESS NOTES
[email or web address]        NAME: Naa Hutchinson is a 54 y o  male  : 1970    MRN: [id number]  DATE: 2022  TIME: 7:57 PM    Assessment and Plan   Abrasion of right cornea, initial encounter [S05 01XA]  1  Abrasion of right cornea, initial encounter  tetracaine 0 5 % ophthalmic solution 2 drop    fluorescein sodium sterile 1 mg ophthalmic strip 1 strip         Patient Instructions       Follow up with PCP in 3-5 days  Proceed to  ER if symptoms worsen  Chief Complaint     Chief Complaint   Patient presents with    Foreign Body in Eye     in right, started tonight  Unsure of what it can be  History of Present Illness       He is a  and he felt like he gets something in the eye today  The eye feels irritated particularly under the upper lid  Review of Systems   Review of Systems   Eyes: Positive for pain and redness           Current Medications       Current Outpatient Medications:     atorvastatin (LIPITOR) 10 mg tablet, TAKE 1 TABLET BY MOUTH EVERY DAY, Disp: 30 tablet, Rfl: 4    Cholecalciferol (VITAMIN D3) 400 units CAPS, Take 1 capsule by mouth daily, Disp: , Rfl:     Current Facility-Administered Medications:     fluorescein sodium sterile 1 mg ophthalmic strip 1 strip, 1 strip, Right Eye, Once, Talib Chong MD    tetracaine 0 5 % ophthalmic solution 2 drop, 2 drop, Right Eye, Once, Talib Chong MD    Current Allergies     Allergies as of 2022    (No Known Allergies)            The following portions of the patient's history were reviewed and updated as appropriate: allergies, current medications, past family history, past medical history, past social history, past surgical history and problem list      Past Medical History:   Diagnosis Date    Hiatal hernia     Onychomycosis of toenail        Past Surgical History:   Procedure Laterality Date    KNEE SURGERY         Family History   Problem Relation Age of Onset    Thyroid cancer Mother     Lung cancer Mother     Coronary artery disease Father     Hyperlipidemia Sister     Hyperlipidemia Brother     Substance Abuse Neg Hx     Mental illness Neg Hx          Medications have been verified  Objective   /74   Pulse 81   Temp (!) 97 °F (36 1 °C)   Resp 18   Ht 5' 8" (1 727 m)   Wt 94 3 kg (208 lb)   SpO2 96%   BMI 31 63 kg/m²   No LMP for male patient  Physical Exam     Physical Exam  Eyes:      Comments: No foreign body detected anywhere over the cornea or under the upper lid or in the lower lid  Fluorescein stain reveals mild abrasion noted inferior to the pupil

## 2022-01-18 NOTE — PATIENT INSTRUCTIONS
Using antibiotic drops you have at home    If you are not markedly improved in 48 hours you must be recheck

## 2022-01-19 ENCOUNTER — OFFICE VISIT (OUTPATIENT)
Dept: URGENT CARE | Facility: CLINIC | Age: 52
End: 2022-01-19
Payer: COMMERCIAL

## 2022-01-19 VITALS
TEMPERATURE: 97 F | RESPIRATION RATE: 18 BRPM | BODY MASS INDEX: 31.37 KG/M2 | HEIGHT: 68 IN | SYSTOLIC BLOOD PRESSURE: 110 MMHG | OXYGEN SATURATION: 100 % | WEIGHT: 207 LBS | DIASTOLIC BLOOD PRESSURE: 76 MMHG | HEART RATE: 74 BPM

## 2022-01-19 DIAGNOSIS — H00.022 HORDEOLUM INTERNUM OF RIGHT LOWER EYELID: Primary | ICD-10-CM

## 2022-01-19 PROCEDURE — 99213 OFFICE O/P EST LOW 20 MIN: CPT

## 2022-01-19 RX ORDER — OFLOXACIN 3 MG/ML
1 SOLUTION/ DROPS OPHTHALMIC 4 TIMES DAILY
Qty: 5 ML | Refills: 0 | Status: SHIPPED | OUTPATIENT
Start: 2022-01-19 | End: 2022-01-26

## 2022-01-19 NOTE — PROGRESS NOTES
NAME: Odilia Marshall is a 54 y o  male  : 1970    MRN: [id number]      Assessment and Plan   Hordeolum internum of right lower eyelid [H00 022]  1  Hordeolum internum of right lower eyelid  ofloxacin (OCUFLOX) 0 3 % ophthalmic solution      discussed with patient that it appears he has developed an internal hordeolum  Discussed switching antibiotic eyedrops and following up with eye doctor if no improvement  Sooner or ER if anything changes or worsens  He acknowledges      Patient Instructions     Patient Instructions   Stye   WHAT YOU NEED TO KNOW:   A stye is a lump on the edge or inside of your eyelid caused by an infection  A stye can form on your upper or lower eyelid  It usually goes away in 2 to 4 days  DISCHARGE INSTRUCTIONS:   Medicines:   · Antibiotic medicine: This is given as an ointment to put into your eye  It is used to fight an infection caused by bacteria  Use as directed  · Take your medicine as directed  Contact your healthcare provider if you think your medicine is not helping or if you have side effects  Tell him of her if you are allergic to any medicine  Keep a list of the medicines, vitamins, and herbs you take  Include the amounts, and when and why you take them  Bring the list or the pill bottles to follow-up visits  Carry your medicine list with you in case of an emergency  Follow up with your doctor as directed:  Write down your questions so you remember to ask them during your visits  Self-care:   · Use warm compresses: This will help decrease swelling and pain  Wet a clean washcloth with warm water and place it on your eye for 10 to 15 minutes, 3 to 4 times each day or as directed  · Keep your hands away from your eye: This helps to prevent the spread of the infection to other parts of the eye  Wash your hands often with soap and dry with a clean towel  Do not squeeze the stye  · Do not use eye makeup:  Do not wear eye makeup while you have a stye  Eye makeup may carry bacteria and cause another stye  Throw away eye makeup and brushes used to apply the makeup  Use new eye makeup after the stye has gone away  Do not share eye makeup with others  · Prevent another stye:  Wash your face and clean your eyelashes every day  Remove eye makeup with makeup remover  This helps to completely remove eye makeup without heavy rubbing  Contact your healthcare provider if:   · You have redness and discharge around your eye, and your eye pain is getting worse  · Your vision changes  · The stye has not gone away within 7 days  · The stye comes back within a short period of time after treatment  · You have questions or concerns about your condition or care  © Copyright [employer] 2021 Information is for End User's use only and may not be sold, redistributed or otherwise used for commercial purposes  All illustrations and images included in CareNotes® are the copyrighted property of [employer] M , Inc  or [facility] [employer]   The above information is an  only  It is not intended as medical advice for individual conditions or treatments  Talk to your doctor, nurse or pharmacist before following any medical regimen to see if it is safe and effective for you  Proceed to ER if symptoms worsen  Chief Complaint     Chief Complaint   Patient presents with    Eye Pain     right eye pain started Monday  History of Present Illness   Patient with no pertinent past medical history presents complaining of right eye irritation x2 days  Reports on Monday got something in his right eye and was told he had a corneal abrasion  Was using antibiotic drops that he had at home-unsure of the name  Reports improvement but then states he feels he now has something in there again  Reports soreness to the lower lid area and scratching sensation when blinking  No new injury  Denies any discharge but does report tearing  A little bit of itching  No blurry or double vision  No fevers, chills, cough, congestion or any headache  Has not tried anything else over-the-counter  Denies contact lens use      Review of Systems   Review of Systems   Constitutional: Negative for chills and fever  HENT: Negative for congestion  Eyes: Positive for photophobia, pain, discharge (Tearing), redness and itching  Negative for visual disturbance  Respiratory: Negative for cough  Neurological: Negative for dizziness, light-headedness and headaches  Current Medications       Current Outpatient Medications:     atorvastatin (LIPITOR) 10 mg tablet, TAKE 1 TABLET BY MOUTH EVERY DAY, Disp: 30 tablet, Rfl: 4    Cholecalciferol (VITAMIN D3) 400 units CAPS, Take 1 capsule by mouth daily, Disp: , Rfl:     ofloxacin (OCUFLOX) 0 3 % ophthalmic solution, Administer 1 drop to the right eye 4 (four) times a day for 7 days, Disp: 5 mL, Rfl: 0    Current Allergies     Allergies as of 01/19/2022    (No Known Allergies)              Past Medical History:   Diagnosis Date    Hiatal hernia     Onychomycosis of toenail        Past Surgical History:   Procedure Laterality Date    KNEE SURGERY         Family History   Problem Relation Age of Onset    Thyroid cancer Mother     Lung cancer Mother     Coronary artery disease Father     Hyperlipidemia Sister     Hyperlipidemia Brother     Substance Abuse Neg Hx     Mental illness Neg Hx          Medications have been verified  The following portions of the patient's history were reviewed and updated as appropriate: allergies, current medications, past family history, past medical history, past social history, past surgical history and problem list     Objective   /76   Pulse 74   Temp (!) 97 °F (36 1 °C)   Resp 18   Ht 5' 8" (1 727 m)   Wt 93 9 kg (207 lb)   SpO2 100%   BMI 31 47 kg/m²      Physical Exam     Physical Exam  Vitals and nursing note reviewed     Constitutional:       General: He is not in acute distress  Appearance: Normal appearance  He is not ill-appearing, toxic-appearing or diaphoretic  Eyes:      Extraocular Movements: Extraocular movements intact  Pupils: Pupils are equal, round, and reactive to light  Comments: Right eye:  External eyelids normal without erythema or edema  Conjunctiva is pink but injected  Inspection of the internal lower lid reveals 1 white pinpoint papule at the lateral 3rd of the lower lid with small amount of surrounding erythema  When palpated with cotton swab patient reports this reproduces pain  Cornea clear tangential lighting  No discharge  No pain with eye movements  Cardiovascular:      Rate and Rhythm: Normal rate and regular rhythm  Pulmonary:      Effort: Pulmonary effort is normal  No respiratory distress  Skin:     Capillary Refill: Capillary refill takes less than 2 seconds  Neurological:      Mental Status: He is alert and oriented to person, place, and time

## 2022-01-19 NOTE — PATIENT INSTRUCTIONS
Stye   WHAT YOU NEED TO KNOW:   A stye is a lump on the edge or inside of your eyelid caused by an infection  A stye can form on your upper or lower eyelid  It usually goes away in 2 to 4 days  DISCHARGE INSTRUCTIONS:   Medicines:   · Antibiotic medicine: This is given as an ointment to put into your eye  It is used to fight an infection caused by bacteria  Use as directed  · Take your medicine as directed  Contact your healthcare provider if you think your medicine is not helping or if you have side effects  Tell him of her if you are allergic to any medicine  Keep a list of the medicines, vitamins, and herbs you take  Include the amounts, and when and why you take them  Bring the list or the pill bottles to follow-up visits  Carry your medicine list with you in case of an emergency  Follow up with your doctor as directed:  Write down your questions so you remember to ask them during your visits  Self-care:   · Use warm compresses: This will help decrease swelling and pain  Wet a clean washcloth with warm water and place it on your eye for 10 to 15 minutes, 3 to 4 times each day or as directed  · Keep your hands away from your eye: This helps to prevent the spread of the infection to other parts of the eye  Wash your hands often with soap and dry with a clean towel  Do not squeeze the stye  · Do not use eye makeup:  Do not wear eye makeup while you have a stye  Eye makeup may carry bacteria and cause another stye  Throw away eye makeup and brushes used to apply the makeup  Use new eye makeup after the stye has gone away  Do not share eye makeup with others  · Prevent another stye:  Wash your face and clean your eyelashes every day  Remove eye makeup with makeup remover  This helps to completely remove eye makeup without heavy rubbing  Contact your healthcare provider if:   · You have redness and discharge around your eye, and your eye pain is getting worse      · Your vision changes  · The stye has not gone away within 7 days  · The stye comes back within a short period of time after treatment  · You have questions or concerns about your condition or care  © Copyright [employer] 2021 Information is for End User's use only and may not be sold, redistributed or otherwise used for commercial purposes  All illustrations and images included in CareNotes® are the copyrighted property of [employer] , Inc  or [facility]   The above information is an  only  It is not intended as medical advice for individual conditions or treatments  Talk to your doctor, nurse or pharmacist before following any medical regimen to see if it is safe and effective for you

## 2022-03-17 ENCOUNTER — TELEPHONE (OUTPATIENT)
Dept: FAMILY MEDICINE CLINIC | Facility: CLINIC | Age: 52
End: 2022-03-17

## 2022-03-17 NOTE — TELEPHONE ENCOUNTER
Pt is requesting to have an MRI done for his persistent back pain  He has seen a chiropractor 10X and it is not getting any better    Can you enter an order or do you need to see him for a follow up appt first?

## 2022-03-21 ENCOUNTER — TELEPHONE (OUTPATIENT)
Dept: FAMILY MEDICINE CLINIC | Facility: CLINIC | Age: 52
End: 2022-03-21

## 2022-03-21 NOTE — TELEPHONE ENCOUNTER
Patient is calling to let you know he spoke his chiropractor and they are going to send you the information via mail so patient can get MRI

## 2022-03-30 NOTE — TELEPHONE ENCOUNTER
Patient called back, he said the fax machine for the chiropractor has broken    I called their office to find out what is going on and haw we can get the office note

## 2022-03-30 NOTE — TELEPHONE ENCOUNTER
And danitza faxed it  I guided him on how to use his fax  So can I call him and tell him we are working on the MRI?

## 2022-03-30 NOTE — TELEPHONE ENCOUNTER
I ordered it, he should call and schedule it and then [facility] will review chiro information, confirm he tried conservative treatment and failed and should be approved   We will have to wait and see

## 2022-04-23 ENCOUNTER — HOSPITAL ENCOUNTER (OUTPATIENT)
Dept: MRI IMAGING | Facility: HOSPITAL | Age: 52
Discharge: HOME | End: 2022-04-23
Payer: COMMERCIAL

## 2022-04-23 DIAGNOSIS — M54.16 LUMBAR BACK PAIN WITH RADICULOPATHY AFFECTING LOWER EXTREMITY: ICD-10-CM

## 2022-04-23 PROCEDURE — G1004 CDSM NDSC: HCPCS

## 2022-04-23 PROCEDURE — 72148 MRI LUMBAR SPINE W/O DYE: CPT

## 2022-04-26 ENCOUNTER — TELEPHONE (OUTPATIENT)
Dept: FAMILY MEDICINE CLINIC | Facility: CLINIC | Age: 52
End: 2022-04-26

## 2022-04-26 NOTE — TELEPHONE ENCOUNTER
----- Message from Alejandra Rao PA-C sent at 4/26/2022  9:27 AM EDT -----  Patient has significant degenerative disc disease in lumbar region, needs to see Pain Management, Dr Rogerio Daily

## 2022-05-04 ENCOUNTER — CONSULT (OUTPATIENT)
Dept: PAIN MEDICINE | Facility: CLINIC | Age: 52
End: 2022-05-04
Payer: COMMERCIAL

## 2022-05-04 VITALS
HEIGHT: 68 IN | WEIGHT: 210 LBS | HEART RATE: 80 BPM | SYSTOLIC BLOOD PRESSURE: 120 MMHG | TEMPERATURE: 98.7 F | DIASTOLIC BLOOD PRESSURE: 82 MMHG | BODY MASS INDEX: 31.83 KG/M2

## 2022-05-04 DIAGNOSIS — M51.26 LUMBAR DISC HERNIATION: ICD-10-CM

## 2022-05-04 DIAGNOSIS — M48.061 LUMBAR FORAMINAL STENOSIS: Primary | ICD-10-CM

## 2022-05-04 DIAGNOSIS — M54.16 RIGHT LUMBAR RADICULITIS: ICD-10-CM

## 2022-05-04 PROCEDURE — 3008F BODY MASS INDEX DOCD: CPT | Performed by: ANESTHESIOLOGY

## 2022-05-04 PROCEDURE — 99204 OFFICE O/P NEW MOD 45 MIN: CPT | Performed by: ANESTHESIOLOGY

## 2022-05-04 NOTE — PATIENT INSTRUCTIONS
Epidural Steroid Injection   AMBULATORY CARE:   What you need to know about an epidural steroid injection (ILAN):  An ILAN is a procedure to inject steroid medicine into the epidural space  The epidural space is between your spinal cord and vertebrae  Steroids reduce inflammation and fluid buildup in your spine that may be causing pain  You may be given pain medicine along with the steroids  How to prepare for an ILAN:  Your healthcare provider will talk to you about how to prepare for your procedure  He or she will tell you what medicines to take or not take on the day of your procedure  You may need to stop taking blood thinners or other medicines several days before your procedure  You may need to adjust any diabetes medicine you take on the day of your procedure  Steroid medicine can increase your blood sugar level  Arrange for someone to drive you home when you are discharged  What will happen during an ILAN:   · You will be given medicine to numb the procedure area  You will be awake for the procedure, but you will not feel pain  You may also be given medicine to help you relax  Contrast liquid will be used to help your healthcare provider see the area better  Tell the healthcare provider if you have ever had an allergic reaction to contrast liquid  · Your healthcare provider may place the needle into your neck area, middle of your back, or tailbone area  He may inject the medicine next to the nerves that are causing your pain  He may instead inject the medicine into a larger area of the epidural space  This helps the medicine spread to more nerves  Your healthcare provider will use a fluoroscope to help guide the needle to the right place  A fluoroscope is a type of x-ray  After the procedure, a bandage will be placed over the injection site to prevent infection  What will happen after an ILAN:  You will have a bandage over the injection site to prevent infection   Your healthcare provider will tell you when you can bathe and any activity guidelines  You will be able to go home  Risks of an ILAN:  You may have temporary or permanent nerve damage or paralysis  You may have bleeding or develop a serious infection, such as meningitis (swelling of the brain coverings)  An abscess may also develop  An abscess is a pus-filled area under the skin  You may need surgery to fix the abscess  You may have a seizure, anxiety, or trouble sleeping  If you are a man, you may have temporary erectile dysfunction (not able to have an erection)  Call your local emergency number (911 in the [address] [place] [address],[address]) if:   · You have a seizure  · You have trouble moving your legs  Seek care immediately if:   · Blood soaks through your bandage  · You have a fever or chills, severe back pain, and the procedure area is sensitive to the touch  · You cannot control when you urinate or have a bowel movement  Call your doctor if:   · You have weakness or numbness in your legs  · Your wound is red, swollen, or draining pus  · You have nausea or are vomiting  · Your face or neck is red and you feel warm  · You have more pain than you had before the procedure  · You have swelling in your hands or feet  · You have questions or concerns about your condition or care  Care for your wound as directed: You may remove the bandage before you go to bed the day of your procedure  You may take a shower, but do not take a bath for at least 24 hours  Self-care:   · Do not drive,  use machines, or do strenuous activity for 24 hours after your procedure or as directed  · Continue other treatments  as directed  Steroid injections alone will not control your pain  The injections are meant to be used with other treatments, such as physical therapy  Follow up with your doctor as directed:  Write down your questions so you remember to ask them during your visits     © Copyright [employer] 2022 Information is for End User's use only and may not be sold, redistributed or otherwise used for commercial purposes  All illustrations and images included in CareNotes® are the copyrighted property of [employer]  or [employer]  The above information is an  only  It is not intended as medical advice for individual conditions or treatments  Talk to your doctor, nurse or pharmacist before following any medical regimen to see if it is safe and effective for you

## 2022-05-04 NOTE — PROGRESS NOTES
Assessment  1  Lumbar foraminal stenosis    2  Lumbar disc herniation    3  Right lumbar radiculitis        Plan    The patient's pain persists despite time, relative rest, activity modification and chiropractic treatment  I believe that he may benefit from a lumbar epidural steroid injection to diminish any inflammatory component of his pain  I will initially use a transforaminal approach to better concentrate the steroid along the affected nerve root  The injection may need to be repeated based on the degree of pain relief following the initial injection  If he does not obtain any significant relief from the above treatment plan and one would consider surgical evaluation  Patient will continue with chiropractic trigger as he is not interested in physical therapy as per my suggestion  In the office today, we reviewed the nature of the patient's pathology in depth using  diagrams and models  I discussed the approach I would use for the epidural steroid injection and provided literature for home review  The patient understands the risks associated with the procedure including but not limited to bleeding, infection, tissue injury, decreased immunity secondary to steroid injection, exacerbation of symptoms, allergic reaction, spinal headache, and paralysis and provided verbal consent  My impressions and treatment recommendations were discussed in detail with the patient who verbalized understanding and had no further questions  Discharge instructions were provided  I personally saw and examined the patient and I agree with the above discussed plan of care  This note is created using dictation transcription  It may contain typographical errors, grammatical errors, improperly dictated words, background noise and other errors      Orders Placed This Encounter   Procedures    FL spine and pain procedure     Standing Status:   Future     Standing Expiration Date:   5/4/2026     Order Specific Question: Reason for Exam:     Answer:   Right L3 and L4 Tfesi 1     Order Specific Question:   Anticoagulant hold needed? Answer:   no    FL spine and pain procedure     Standing Status:   Future     Standing Expiration Date:   5/4/2026     Order Specific Question:   Reason for Exam:     Answer:   rigth L3 and L4 TFESi 2     Order Specific Question:   Anticoagulant hold needed? Answer:   no     No orders of the defined types were placed in this encounter  Referred By: Self  History of Present Illness    Checo Hernández is a 54 y o  male with a month history of right-sided low back and lower extremity pain  He is unaware of any clear precipitating event denies any trauma or injury  He has been taking acetaminophen and ibuprofen and chiropractic treatment is pain persists which is moderate to severe  He rates it between eight to 10/10 on the visual analog scale significant interfering with daily living activities  His pain is nearly constant describes burning shooting cutting with subjective weakness of his lower limbs  Sitting decreases symptoms while exercise aggravates his pain  He denies any loss of bowel or bladder function  I have personally reviewed and/or updated the patient's past medical history, past surgical history, family history, social history, current medications, allergies, and vital signs today  Review of Systems   Constitutional: Negative for fever and unexpected weight change  HENT: Negative for trouble swallowing  Eyes: Negative for visual disturbance  Respiratory: Negative for shortness of breath and wheezing  Cardiovascular: Negative for chest pain and palpitations  Gastrointestinal: Negative for constipation, diarrhea, nausea and vomiting  Endocrine: Negative for cold intolerance, heat intolerance and polydipsia  Genitourinary: Negative for difficulty urinating and frequency     Musculoskeletal: Negative for arthralgias, gait problem, joint swelling and myalgias  Skin: Negative for rash  Neurological: Negative for dizziness, seizures, syncope, weakness and headaches  Hematological: Does not bruise/bleed easily  Psychiatric/Behavioral: Negative for dysphoric mood  All other systems reviewed and are negative  Patient Active Problem List   Diagnosis    Hyperlipidemia    Insomnia due to other mental disorder    Vitamin D deficiency    Current smoker    Hyperglycemia    Umbilical hernia without obstruction and without gangrene    Mondor's disease    Erectile dysfunction       Past Medical History:   Diagnosis Date    Hiatal hernia     Onychomycosis of toenail        Past Surgical History:   Procedure Laterality Date    KNEE SURGERY         Family History   Problem Relation Age of Onset    Thyroid cancer Mother     Lung cancer Mother     Coronary artery disease Father     Hyperlipidemia Sister     Hyperlipidemia Brother     Substance Abuse Neg Hx     Mental illness Neg Hx        Social History     Occupational History    Not on file   Tobacco Use    Smoking status: Current Every Day Smoker     Packs/day: 1 00     Years: 15 00     Pack years: 15 00     Types: Cigarettes    Smokeless tobacco: Never Used   Vaping Use    Vaping Use: Never used   Substance and Sexual Activity    Alcohol use: Yes     Comment: Rare     Drug use: No    Sexual activity: Not on file       Current Outpatient Medications on File Prior to Visit   Medication Sig    atorvastatin (LIPITOR) 10 mg tablet TAKE 1 TABLET BY MOUTH EVERY DAY    Cholecalciferol (VITAMIN D3) 400 units CAPS Take 1 capsule by mouth daily     No current facility-administered medications on file prior to visit         No Known Allergies    Physical Exam    /82 (BP Location: Left arm, Patient Position: Sitting, Cuff Size: Standard)   Pulse 80   Temp 98 7 °F (37 1 °C)   Ht 5' 8" (1 727 m)   Wt 95 3 kg (210 lb)   BMI 31 93 kg/m²     Constitutional: normal, well developed, well nourished, alert, in no distress and non-toxic and no overt pain behavior  Eyes: anicteric  HEENT: grossly intact  Neck: supple, symmetric, trachea midline and no masses   Pulmonary:even and unlabored  Cardiovascular:No edema or pitting edema present  Skin:Normal without rashes or lesions and well hydrated  Psychiatric:Mood and affect appropriate  Neurologic:Cranial Nerves II-XII grossly intact  Musculoskeletal:normal, Difficulty going from sitting to standing sitting position; no obvious skin lesions or erythema lumbar sacral spine; mild tenderness in lumbar paravertebrals, no sacroiliac or greater trochanteric tenderness bilateral; deep tendon reflexes are diminished but symmetrical bilateral patellar and achilles; no focal motor deficit appreciated lower limbs; positive straight leg raising on the right  Imaging  MRI LUMBAR SPINE WITHOUT CONTRAST @  4-23-22     INDICATION: M54 16: Radiculopathy, lumbar region  Chronic low back pain     COMPARISON:  MR 8/24/2005, x-ray 11/17/2021     TECHNIQUE:  Sagittal T1, sagittal T2, sagittal inversion recovery, axial T1 and axial T2, coronal T2     IMAGE QUALITY:  Diagnostic     FINDINGS:     VERTEBRAL BODIES:  There are 5 nonrib-bearing lumbar type vertebral bodies  Right lateral wedging of L3, asymmetric loss of disc height to the left at L2-3 into the right at L3-L4  Partial sacralization right L5 transverse process  straightening of   normal lumbar lordosis with slight retrolisthesis of the to L3 and minor anterolisthesis of L4 on L5  Diffuse Modic type II marrow changes have progressed since remote exam these are evident from L2 to the upper L5 level      SACRUM:  Normal signal within the sacrum  No evidence of insufficiency or stress fracture  Partial sacralization right L5 transverse process      DISTAL CORD AND CONUS:  Normal size and signal within the distal cord and conus    Conus terminates at the L1-L2 level      PARASPINAL SOFT TISSUES:  Paraspinal soft tissues are unremarkable      LOWER THORACIC DISC SPACES:  Normal disc height and signal   No disc herniation, canal stenosis or foraminal narrowing  Bulging discs T11-T12      LUMBAR DISC SPACES:     L1-L2:  Minor facet arthrosis, minimal bulge, no critical stenosis      L2-L3:  Slight rightward translation and retrolisthesis of L2, moderate bilateral facet arthrosis, circumferential bulge with mild canal stenosis  Asymmetric left lateral osteophytes, no critical stenosis      L3-L4:  Reduction disc height asymmetric to the right where right lateral disc osteophytes are in contact with post foraminal right L3 root  Minor circumferential bulge and moderate facet arthrosis  Correlate for right L3 radiculitis  Large extruded   contiguous fragment on the left, noted on prior study has resorbed      L4-L5:  Loss of disc height with circumferential bulge and marginal osteophytes  The disc extends asymmetrically into the right foramen  Bilateral, right greater than left facet arthrosis  Mild left and severe right foraminal stenosis, correlate for   right L4 radiculitis  Mild canal stenosis  Disease within the right foramen has progressed since prior study      L5-S1:  Minor bulge, bilateral left greater than right facet arthrosis, no critical stenosis  Partial sacralization right transverse process      IMPRESSION:     Multilevel spondylosis and osteoarthritis has progressed since remote exam   Severe right L4-5 foraminal stenosis, correlate for right L4 radiculitis      Partial sacralization right L5 transverse process      Disc in contact with post foraminal right L3 root, correlate for right L3 radiculitis    Descending, contiguous disc fragment on the left previously noted at this level has resorbed      LUMBAR SPINE @ [facility] 11-17-21     INDICATION:   M54 16: Radiculopathy, lumbar region      COMPARISON:  8/24/2005 MRI     VIEWS:  XR SPINE LUMBAR MINIMUM 4 VIEWS NON INJURY  Images: 6     FINDINGS:     There are 5 non rib bearing lumbar vertebral bodies       There is no evidence of acute fracture or destructive osseous lesion      Minimal dextroscoliosis, apex T12-L1     Moderate degenerative disc disease L2-3, L3-4, L4-5  with interval progression      The pedicles appear intact      Soft tissues are unremarkable      IMPRESSION:  Progressive multilevel degenerative spondylosis     No acute lumbar spinal abnormalities identified    I have personally reviewed pertinent films in PACS and my interpretation is Foraminal stenosis as well as disc herniation

## 2022-05-17 ENCOUNTER — TELEPHONE (OUTPATIENT)
Dept: FAMILY MEDICINE CLINIC | Facility: CLINIC | Age: 52
End: 2022-05-17

## 2022-05-17 DIAGNOSIS — N39.0 URINARY TRACT INFECTION WITHOUT HEMATURIA, SITE UNSPECIFIED: Primary | ICD-10-CM

## 2022-05-17 DIAGNOSIS — K40.90 UNILATERAL INGUINAL HERNIA WITHOUT OBSTRUCTION OR GANGRENE, RECURRENCE NOT SPECIFIED: ICD-10-CM

## 2022-05-17 NOTE — TELEPHONE ENCOUNTER
Patient said he spoke to you about having a possible hernia some time ago  He is asking for a referral to a plastic surgeon  Please enter ambulatory referral for plastics  He said its a residency clinic that will possibly doing it if they decide to take him   ( Abdominal & lipo)

## 2022-05-19 ENCOUNTER — HOSPITAL ENCOUNTER (OUTPATIENT)
Dept: RADIOLOGY | Facility: CLINIC | Age: 52
Discharge: HOME | End: 2022-05-19
Attending: ANESTHESIOLOGY
Payer: COMMERCIAL

## 2022-05-19 VITALS
OXYGEN SATURATION: 97 % | RESPIRATION RATE: 20 BRPM | DIASTOLIC BLOOD PRESSURE: 70 MMHG | SYSTOLIC BLOOD PRESSURE: 112 MMHG | TEMPERATURE: 97.7 F | HEART RATE: 65 BPM

## 2022-05-19 DIAGNOSIS — M54.16 RIGHT LUMBAR RADICULITIS: ICD-10-CM

## 2022-05-19 DIAGNOSIS — M48.061 LUMBAR FORAMINAL STENOSIS: ICD-10-CM

## 2022-05-19 DIAGNOSIS — M51.26 LUMBAR DISC HERNIATION: ICD-10-CM

## 2022-05-19 PROCEDURE — 64484 NJX AA&/STRD TFRM EPI L/S EA: CPT | Performed by: ANESTHESIOLOGY

## 2022-05-19 PROCEDURE — A9585 GADOBUTROL INJECTION: HCPCS | Performed by: ANESTHESIOLOGY

## 2022-05-19 PROCEDURE — 64483 NJX AA&/STRD TFRM EPI L/S 1: CPT | Performed by: ANESTHESIOLOGY

## 2022-05-19 RX ORDER — PAPAVERINE HCL 150 MG
15 CAPSULE, EXTENDED RELEASE ORAL ONCE
Status: COMPLETED | OUTPATIENT
Start: 2022-05-19 | End: 2022-05-19

## 2022-05-19 RX ADMIN — DEXAMETHASONE SODIUM PHOSPHATE 15 MG: 10 INJECTION, SOLUTION INTRAMUSCULAR; INTRAVENOUS at 12:14

## 2022-05-19 RX ADMIN — GADOBUTROL 2 ML: 604.72 INJECTION INTRAVENOUS at 12:14

## 2022-05-19 NOTE — H&P
History of Present Illness:  The patient is a 54 y o  male who presents with complaints of low back and leg pain    Patient Active Problem List   Diagnosis    Hyperlipidemia    Insomnia due to other mental disorder    Vitamin D deficiency    Current smoker    Hyperglycemia    Umbilical hernia without obstruction and without gangrene    Mondor's disease    Erectile dysfunction       Past Medical History:   Diagnosis Date    Hiatal hernia     Onychomycosis of toenail        Past Surgical History:   Procedure Laterality Date    KNEE SURGERY           Current Outpatient Medications:     atorvastatin (LIPITOR) 10 mg tablet, TAKE 1 TABLET BY MOUTH EVERY DAY, Disp: 30 tablet, Rfl: 0    Cholecalciferol (VITAMIN D3) 400 units CAPS, Take 1 capsule by mouth daily, Disp: , Rfl:     No Known Allergies    Physical Exam:   General: Awake, Alert, Oriented x 3, Mood and affect appropriate  Respiratory: Respirations even and unlabored  Cardiovascular: Peripheral pulses intact; no edema  Musculoskeletal Exam:  Decreased range of motion lumbar spine    ASA Score: II         Assessment:  Lumbar disc herniation, lumbar radiculitis    Plan: Right L3 and L4 Tfesi 1

## 2022-05-19 NOTE — DISCHARGE INSTRUCTIONS
Epidural Steroid Injection   WHAT YOU NEED TO KNOW:   An epidural steroid injection (ILAN) is a procedure to inject steroid medicine into the epidural space  The epidural space is between your spinal cord and vertebrae  Steroids reduce inflammation and fluid buildup in your spine that may be causing pain  You may be given pain medicine along with the steroids  ACTIVITY  Do not drive or operate machinery today  No strenuous activity today - bending, lifting, etc   You may resume normal activites starting tomorrow - start slowly and as tolerated  You may shower today, but no tub baths or hot tubs  You may have numbness for several hours from the local anesthetic  Please use caution and common sense, especially with weight-bearing activities  CARE OF THE INJECTION SITE  If you have soreness or pain, apply ice to the area today (20 minutes on/20 minutes off)  Starting tomorrow, you may use warm, moist heat or ice if needed  You may have an increase or change in your discomfort for 36-48 hours after your treatment  Apply ice and continue with any pain medication you have been prescribed  Notify the [facility] if you have any of the following: redness, drainage, swelling, headache, stiff neck or fever above 100°F     SPECIAL INSTRUCTIONS  Our office will contact you in approximately 7 days for a progress report  MEDICATIONS  Continue to take all routine medications  Our office may have instructed you to hold some medications  As no general anesthesia was used in today's procedure, you should not experience any side effects related to anesthesia  If you have a problem specifically related to your procedure, please call our office at [phone]  Problems not related to your procedure should be directed to your primary care physician

## 2022-05-26 ENCOUNTER — TELEPHONE (OUTPATIENT)
Dept: PAIN MEDICINE | Facility: CLINIC | Age: 52
End: 2022-05-26

## 2022-05-26 NOTE — TELEPHONE ENCOUNTER
1st attempt  Lm to cb with % improvement and pain level.     Right L3 and L4 Tfesi 1 5/19, 6/8 TFESI #2

## 2022-06-08 ENCOUNTER — HOSPITAL ENCOUNTER (OUTPATIENT)
Dept: RADIOLOGY | Facility: CLINIC | Age: 52
Discharge: HOME | End: 2022-06-08
Attending: ANESTHESIOLOGY | Admitting: ANESTHESIOLOGY
Payer: COMMERCIAL

## 2022-06-08 VITALS
TEMPERATURE: 97.9 F | DIASTOLIC BLOOD PRESSURE: 68 MMHG | RESPIRATION RATE: 18 BRPM | HEART RATE: 78 BPM | SYSTOLIC BLOOD PRESSURE: 115 MMHG | OXYGEN SATURATION: 95 %

## 2022-06-08 DIAGNOSIS — M54.16 RIGHT LUMBAR RADICULITIS: ICD-10-CM

## 2022-06-08 DIAGNOSIS — M51.26 LUMBAR DISC HERNIATION: ICD-10-CM

## 2022-06-08 DIAGNOSIS — M48.061 LUMBAR FORAMINAL STENOSIS: ICD-10-CM

## 2022-06-08 PROCEDURE — 64484 NJX AA&/STRD TFRM EPI L/S EA: CPT | Performed by: ANESTHESIOLOGY

## 2022-06-08 PROCEDURE — A9585 GADOBUTROL INJECTION: HCPCS | Performed by: ANESTHESIOLOGY

## 2022-06-08 PROCEDURE — 64483 NJX AA&/STRD TFRM EPI L/S 1: CPT | Performed by: ANESTHESIOLOGY

## 2022-06-08 RX ORDER — PAPAVERINE HCL 150 MG
15 CAPSULE, EXTENDED RELEASE ORAL ONCE
Status: COMPLETED | OUTPATIENT
Start: 2022-06-08 | End: 2022-06-08

## 2022-06-08 RX ADMIN — GADOBUTROL 1 ML: 604.72 INJECTION INTRAVENOUS at 15:08

## 2022-06-08 RX ADMIN — DEXAMETHASONE SODIUM PHOSPHATE 15 MG: 10 INJECTION, SOLUTION INTRAMUSCULAR; INTRAVENOUS at 15:08

## 2022-06-08 NOTE — DISCHARGE INSTR - LAB
Epidural Steroid Injection   WHAT YOU NEED TO KNOW:   An epidural steroid injection (ILAN) is a procedure to inject steroid medicine into the epidural space  The epidural space is between your spinal cord and vertebrae  Steroids reduce inflammation and fluid buildup in your spine that may be causing pain  You may be given pain medicine along with the steroids  ACTIVITY  Do not drive or operate machinery today  No strenuous activity today - bending, lifting, etc   You may resume normal activites starting tomorrow - start slowly and as tolerated  You may shower today, but no tub baths or hot tubs  You may have numbness for several hours from the local anesthetic  Please use caution and common sense, especially with weight-bearing activities  CARE OF THE INJECTION SITE  If you have soreness or pain, apply ice to the area today (20 minutes on/20 minutes off)  Starting tomorrow, you may use warm, moist heat or ice if needed  You may have an increase or change in your discomfort for 36-48 hours after your treatment  Apply ice and continue with any pain medication you have been prescribed  Notify the [facility] if you have any of the following: redness, drainage, swelling, headache, stiff neck or fever above 100°F     SPECIAL INSTRUCTIONS  Our office will contact you in approximately 7 days for a progress report  MEDICATIONS  Continue to take all routine medications  Our office may have instructed you to hold some medications  As no general anesthesia was used in today's procedure, you should not experience any side effects related to anesthesia  If you have a problem specifically related to your procedure, please call our office at [phone]  Problems not related to your procedure should be directed to your primary care physician

## 2022-06-08 NOTE — H&P
History of Present Illness: The patient is a 54 y o  male who presents with complaints of low back and leg pain  Patient Active Problem List   Diagnosis    Hyperlipidemia    Insomnia due to other mental disorder    Vitamin D deficiency    Current smoker    Hyperglycemia    Umbilical hernia without obstruction and without gangrene    Mondor's disease    Erectile dysfunction    Lumbar disc herniation    Lumbar foraminal stenosis       Past Medical History:   Diagnosis Date    Hiatal hernia     Onychomycosis of toenail        Past Surgical History:   Procedure Laterality Date    KNEE SURGERY           Current Outpatient Medications:     atorvastatin (LIPITOR) 10 mg tablet, TAKE 1 TABLET BY MOUTH EVERY DAY, Disp: 30 tablet, Rfl: 0    Cholecalciferol (VITAMIN D3) 400 units CAPS, Take 1 capsule by mouth daily, Disp: , Rfl:     Current Facility-Administered Medications:     dexamethasone (PF) (DECADRON) injection 15 mg, 15 mg, Epidural, Once, Miah Putnam,     Gadobutrol injection (SINGLE-DOSE) SOLN 10 mL, 10 mL, Other, Once, [id number], DO    No Known Allergies    Physical Exam:   Vitals:    06/08/22 1455   BP: 112/71   Pulse: 76   Resp: 18   Temp: 97 9 °F (36 6 °C)   SpO2: 93%     General: Awake, Alert, Oriented x 3, Mood and affect appropriate  Respiratory: Respirations even and unlabored  Cardiovascular: Peripheral pulses intact; no edema  Musculoskeletal Exam:  Decreased range of motion lumbar spine    ASA Score: II    Patient/Chart Verification  Patient ID Verified: Verbal  ID Band Applied: No  Consents Confirmed: Procedural, To be obtained in the Pre-Procedure area  H&P( within 30 days) Verified: To be obtained in the Pre-Procedure area  Interval H&P(within 24 hr) Complete (required for Outpatients and Surgery Admit only): To be obtained in the Pre-Procedure area  Allergies Reviewed: Yes  Anticoag/NSAID held?: NA  Currently on antibiotics?: No  Pregnancy denied?: NA    Assessment:   1   Lumbar foraminal stenosis    2  Lumbar disc herniation    3   Right lumbar radiculitis        Plan: rigth L3 and L4 TFESi 2

## 2022-06-12 DIAGNOSIS — E78.5 HYPERLIPIDEMIA, UNSPECIFIED HYPERLIPIDEMIA TYPE: ICD-10-CM

## 2022-06-12 RX ORDER — ATORVASTATIN CALCIUM 10 MG/1
TABLET, FILM COATED ORAL
Qty: 30 TABLET | Refills: 0 | Status: SHIPPED | OUTPATIENT
Start: 2022-06-12 | End: 2022-08-08 | Stop reason: SDUPTHER

## 2022-06-15 ENCOUNTER — TELEPHONE (OUTPATIENT)
Dept: PAIN MEDICINE | Facility: CLINIC | Age: 52
End: 2022-06-15

## 2022-06-15 NOTE — TELEPHONE ENCOUNTER
1st attempt  Lm to cb with % improvement and pain level        right L3 and L4 TFESI 2 6/8 , 6/30 TFESI #2

## 2022-06-15 NOTE — TELEPHONE ENCOUNTER
Pt pain level is a 6/10 at this time  Pain shooting down left leg all the way to his feet and pressure in chin now  When he got the injection pain in right leg      Pt # [phone]

## 2022-06-15 NOTE — TELEPHONE ENCOUNTER
S/w pt, states he has 30-40% improvement in his R sided low back and leg pain  Pt stated that he continues with some pain in his buttock and ankle  However, pt has new pain in the L buttock and down the outside of his L leg to the front of his SHIN  Pt confirmed 6/30 OV with NM - no procedure scheduled at this time  Pt questioned if it would be ok to see his chiropractor at this time  Also stated that he is to be scheduled for umbilical hernia repair and he is aware that his abd muscles are "not good" and contribute to core weakness  Advised pt, the writer will d/w Dr Markus Randhawa and cb to advise  Pt verbalized understanding and appreciation

## 2022-06-30 ENCOUNTER — TELEPHONE (OUTPATIENT)
Dept: PAIN MEDICINE | Facility: CLINIC | Age: 52
End: 2022-06-30

## 2022-06-30 NOTE — TELEPHONE ENCOUNTER
No Show    S/W Pt about his missed appt  He didn't know he had an appt today      I rescheduled his appt for August     Thank you

## 2022-08-03 LAB
ALBUMIN SERPL-MCNC: 4.2 G/DL (ref 3.6–5.1)
ALBUMIN/GLOB SERPL: 2 (CALC) (ref 1–2.5)
ALP SERPL-CCNC: 80 U/L (ref 36–130)
ALT SERPL-CCNC: 19 U/L (ref 9–46)
AST SERPL-CCNC: 19 U/L (ref 10–40)
BILIRUB DIRECT SERPL-MCNC: 0.1 MG/DL
BILIRUB INDIRECT SERPL-MCNC: 0.5 MG/DL (CALC) (ref 0.2–1.2)
BILIRUB SERPL-MCNC: 0.6 MG/DL (ref 0.2–1.2)
CHOLEST SERPL-MCNC: 259 MG/DL
CHOLEST/HDLC SERPL: 4.4 (CALC)
GLOBULIN SER CALC-MCNC: 2.1 G/DL (CALC) (ref 1.9–3.7)
HDLC SERPL-MCNC: 59 MG/DL
LDLC SERPL CALC-MCNC: 175 MG/DL (CALC)
NONHDLC SERPL-MCNC: 200 MG/DL (CALC)
PROT SERPL-MCNC: 6.3 G/DL (ref 6.1–8.1)
TRIGL SERPL-MCNC: 120 MG/DL

## 2022-08-08 ENCOUNTER — TELEPHONE (OUTPATIENT)
Dept: FAMILY MEDICINE CLINIC | Facility: CLINIC | Age: 52
End: 2022-08-08

## 2022-08-08 DIAGNOSIS — E78.5 HYPERLIPIDEMIA, UNSPECIFIED HYPERLIPIDEMIA TYPE: ICD-10-CM

## 2022-08-08 RX ORDER — ATORVASTATIN CALCIUM 20 MG/1
20 TABLET, FILM COATED ORAL DAILY
Qty: 90 TABLET | Refills: 1 | Status: SHIPPED | OUTPATIENT
Start: 2022-08-08

## 2022-08-08 NOTE — TELEPHONE ENCOUNTER
----- Message from Mihai Sullivan PA-C sent at 8/6/2022  9:40 AM EDT -----  Just got patients labs, his cholesterol is elevated, is he taking the lipitor?

## 2022-08-11 ENCOUNTER — OFFICE VISIT (OUTPATIENT)
Dept: PAIN MEDICINE | Facility: CLINIC | Age: 52
End: 2022-08-11
Payer: COMMERCIAL

## 2022-08-11 VITALS
WEIGHT: 211 LBS | HEIGHT: 68 IN | TEMPERATURE: 99 F | SYSTOLIC BLOOD PRESSURE: 110 MMHG | BODY MASS INDEX: 31.98 KG/M2 | DIASTOLIC BLOOD PRESSURE: 78 MMHG

## 2022-08-11 DIAGNOSIS — M54.50 CHRONIC BILATERAL LOW BACK PAIN, UNSPECIFIED WHETHER SCIATICA PRESENT: ICD-10-CM

## 2022-08-11 DIAGNOSIS — M51.26 LUMBAR DISC HERNIATION: ICD-10-CM

## 2022-08-11 DIAGNOSIS — M54.16 LUMBAR RADICULOPATHY: Primary | ICD-10-CM

## 2022-08-11 DIAGNOSIS — G89.4 CHRONIC PAIN SYNDROME: ICD-10-CM

## 2022-08-11 DIAGNOSIS — M47.816 LUMBAR SPONDYLOSIS: ICD-10-CM

## 2022-08-11 DIAGNOSIS — G89.29 CHRONIC BILATERAL LOW BACK PAIN, UNSPECIFIED WHETHER SCIATICA PRESENT: ICD-10-CM

## 2022-08-11 DIAGNOSIS — M48.061 LUMBAR FORAMINAL STENOSIS: ICD-10-CM

## 2022-08-11 PROCEDURE — 99213 OFFICE O/P EST LOW 20 MIN: CPT | Performed by: NURSE PRACTITIONER

## 2022-08-11 NOTE — PROGRESS NOTES
Assessment:  1  Chronic pain syndrome    2  Chronic bilateral low back pain, unspecified whether sciatica present    3  Lumbar disc herniation    4  Lumbar foraminal stenosis    5  Lumbar spondylosis        Plan:  The patient is a 54 y o  male last seen on 06/08/2022 who presents for a follow up office visit in regards to chronic pain secondary to low back pain, lumbar intervertebral disc disorder with radiculopathy, lumbar spondylosis, and  lumbar foraminal stenosis  The patient presents today with ongoing low back and right leg pain  He had underwent 2 right L3 and L4 transforaminal epidural steroid injections, but unfortunately received minimal pain relief  At this time since he has failed interventions as well as chiropractic treatment, I will refer him to Dr Jaida Lopez to discuss surgical options  The patient will follow-up as needed for reevaluation  The patient was advised to contact the office should their symptoms worsen in the interim  The patient was agreeable and verbalized an understanding  History of Present Illness: The patient is a 54 y o  male last seen on 06/08/2022 who presents for a follow up office visit in regards to chronic pain secondary to low back pain, lumbar intervertebral disc disorder with radiculopathy, lumbar spondylosis, and  lumbar foraminal stenosis  His last office visit was June 8, 2022 in which he underwent a right L 3 and L4 transforaminal epidural steroid injection  This was his 2nd injection as he had the same procedure on May 19, 2022  Patient presents today with ongoing low back pain  The pain is located primarily on the right side of the low back and radiates down the posterior aspect of his right leg  The pain is constant and described as burning and sharp  He is rating his pain 8/10 on numeric rating scale  He is currently taking no prescription pain medication  He has been seeing a chiropractor, with no change in the pain   He feels the pain has limited his activity level, and the procedures and medications are not correcting the problem  I have personally reviewed and/or updated the patient's past medical history, past surgical history, family history, social history, current medications, allergies, and vital signs today  Review of Systems:    Review of Systems   Musculoskeletal: Positive for gait problem  Decreased rom         Past Medical History:   Diagnosis Date    Hiatal hernia     Onychomycosis of toenail        Past Surgical History:   Procedure Laterality Date    KNEE SURGERY         Family History   Problem Relation Age of Onset    Thyroid cancer Mother     Lung cancer Mother     Coronary artery disease Father     Hyperlipidemia Sister     Hyperlipidemia Brother     Substance Abuse Neg Hx     Mental illness Neg Hx        Social History     Occupational History    Not on file   Tobacco Use    Smoking status: Current Every Day Smoker     Packs/day: 1 00     Years: 15 00     Pack years: 15 00     Types: Cigarettes    Smokeless tobacco: Never Used   Vaping Use    Vaping Use: Never used   Substance and Sexual Activity    Alcohol use: Yes     Comment: Rare     Drug use: No    Sexual activity: Not on file         Current Outpatient Medications:     atorvastatin (LIPITOR) 20 mg tablet, Take 1 tablet (20 mg total) by mouth daily, Disp: 90 tablet, Rfl: 1    Cholecalciferol (VITAMIN D3) 400 units CAPS, Take 1 capsule by mouth daily, Disp: , Rfl:     No Known Allergies    Physical Exam:    Ht 5' 8" (1 727 m)   BMI 31 93 kg/m²     Constitutional:normal, well developed, well nourished, alert, in no distress and non-toxic and no overt pain behavior    Eyes:anicteric  HEENT:grossly intact  Neck:supple, symmetric, trachea midline and no masses   Pulmonary:even and unlabored  Cardiovascular:No edema or pitting edema present  Skin:Normal without rashes or lesions and well hydrated  Psychiatric:Mood and affect appropriate  Neurologic:Cranial Nerves II-XII grossly intact  Musculoskeletal:normal      Imaging    MRI LUMBAR SPINE WITHOUT CONTRAST     INDICATION: [id number]: Radiculopathy, lumbar region  Chronic low back pain     COMPARISON:  MR 8/24/2005, x-ray 11/17/2021     TECHNIQUE:  Sagittal T1, sagittal T2, sagittal inversion recovery, axial T1 and axial T2, coronal T2     IMAGE QUALITY:  Diagnostic     FINDINGS:     VERTEBRAL BODIES:  There are 5 nonrib-bearing lumbar type vertebral bodies  Right lateral wedging of L3, asymmetric loss of disc height to the left at L2-3 into the right at L3-L4  Partial sacralization right L5 transverse process  straightening of   normal lumbar lordosis with slight retrolisthesis of the to L3 and minor anterolisthesis of L4 on L5  Diffuse Modic type II marrow changes have progressed since remote exam these are evident from L2 to the upper L5 level      SACRUM:  Normal signal within the sacrum  No evidence of insufficiency or stress fracture  Partial sacralization right L5 transverse process      DISTAL CORD AND CONUS:  Normal size and signal within the distal cord and conus  Conus terminates at the L1-L2 level      PARASPINAL SOFT TISSUES:  Paraspinal soft tissues are unremarkable      LOWER THORACIC DISC SPACES:  Normal disc height and signal   No disc herniation, canal stenosis or foraminal narrowing  Bulging discs T11-T12      LUMBAR DISC SPACES:     L1-L2:  Minor facet arthrosis, minimal bulge, no critical stenosis      L2-L3:  Slight rightward translation and retrolisthesis of L2, moderate bilateral facet arthrosis, circumferential bulge with mild canal stenosis  Asymmetric left lateral osteophytes, no critical stenosis      L3-L4:  Reduction disc height asymmetric to the right where right lateral disc osteophytes are in contact with post foraminal right L3 root  Minor circumferential bulge and moderate facet arthrosis  Correlate for right L3 radiculitis    Large extruded contiguous fragment on the left, noted on prior study has resorbed      L4-L5:  Loss of disc height with circumferential bulge and marginal osteophytes  The disc extends asymmetrically into the right foramen  Bilateral, right greater than left facet arthrosis  Mild left and severe right foraminal stenosis, correlate for   right L4 radiculitis  Mild canal stenosis  Disease within the right foramen has progressed since prior study      L5-S1:  Minor bulge, bilateral left greater than right facet arthrosis, no critical stenosis  Partial sacralization right transverse process      IMPRESSION:     Multilevel spondylosis and osteoarthritis has progressed since remote exam   Severe right L4-5 foraminal stenosis, correlate for right L4 radiculitis      Partial sacralization right L5 transverse process      Disc in contact with post foraminal right L3 root, correlate for right L3 radiculitis  Descending, contiguous disc fragment on the left previously noted at this level has resorbed      No orders to display         No orders of the defined types were placed in this encounter

## 2022-08-27 LAB — HBA1C MFR BLD HPLC: 5.7 %

## 2022-09-02 ENCOUNTER — OFFICE VISIT (OUTPATIENT)
Dept: FAMILY MEDICINE CLINIC | Facility: CLINIC | Age: 52
End: 2022-09-02
Payer: COMMERCIAL

## 2022-09-02 VITALS
WEIGHT: 217.8 LBS | SYSTOLIC BLOOD PRESSURE: 122 MMHG | RESPIRATION RATE: 16 BRPM | DIASTOLIC BLOOD PRESSURE: 80 MMHG | BODY MASS INDEX: 33.01 KG/M2 | HEIGHT: 68 IN | HEART RATE: 77 BPM

## 2022-09-02 DIAGNOSIS — Z01.818 PREOP EXAMINATION: Primary | ICD-10-CM

## 2022-09-02 DIAGNOSIS — Z12.11 SCREENING FOR COLON CANCER: ICD-10-CM

## 2022-09-02 DIAGNOSIS — E78.5 HYPERLIPIDEMIA, UNSPECIFIED HYPERLIPIDEMIA TYPE: ICD-10-CM

## 2022-09-02 DIAGNOSIS — M54.16 LUMBAR RADICULOPATHY: ICD-10-CM

## 2022-09-02 DIAGNOSIS — M51.26 LUMBAR DISC HERNIATION: ICD-10-CM

## 2022-09-02 DIAGNOSIS — F17.200 CURRENT SMOKER: ICD-10-CM

## 2022-09-02 PROBLEM — F51.05 INSOMNIA DUE TO OTHER MENTAL DISORDER: Status: RESOLVED | Noted: 2017-04-21 | Resolved: 2022-09-02

## 2022-09-02 PROBLEM — F99 INSOMNIA DUE TO OTHER MENTAL DISORDER: Status: RESOLVED | Noted: 2017-04-21 | Resolved: 2022-09-02

## 2022-09-02 PROBLEM — N52.9 ERECTILE DYSFUNCTION: Status: RESOLVED | Noted: 2021-06-04 | Resolved: 2022-09-02

## 2022-09-02 PROCEDURE — 3725F SCREEN DEPRESSION PERFORMED: CPT | Performed by: PHYSICIAN ASSISTANT

## 2022-09-02 PROCEDURE — 99214 OFFICE O/P EST MOD 30 MIN: CPT | Performed by: PHYSICIAN ASSISTANT

## 2022-09-02 NOTE — PROGRESS NOTES
FAMILY PRACTICE PRE-OPERATIVE EVALUATION  [facility]    NAME: Umang Louise  AGE: 54 y o  SEX: male  : 1970     DATE: 2022    Family Practice Pre-Operative Evaluation      Chief Complaint: Pre-operative Evaluation     Surgery: L4/L5 microdiscectomy  Anticipated Date of Surgery: 2022  Referring Provider: Dr Sharmin Thomas     History of Present Illness:     Umang Louise is a 54 y o  male who presents to the office today for a preoperative consultation at the request of surgeon, Dr Basim Acevedo, who plans on performing L4/L% discectomy on 2022  Planned anesthesia is general  Patient has a bleeding risk of: no recent abnormal bleeding  Patient does not have objections to receiving blood products if needed  Current anti-platelet/anti-coagulation medications that the patient is prescribed includes: none  Assessment of Chronic Conditions:   - hyperlipidemia - on lipitor 20 mg daily   - smoker - no interest in quitting     Assessment of Cardiac Risk:  · Denies unstable or severe angina or MI in the last 6 weeks or history of stent placement in the last year   · Denies decompensated heart failure (e g  New onset heart failure, NYHA functional class IV heart failure, or worsening existing heart failure)  · Denies significant arrhythmias such as high grade AV block, symptomatic ventricular arrhythmia, newly recognized ventricular tachycardia, supraventricular tachycardia with resting heart rate >100, or symptomatic bradycardia  · Denies severe heart valve disease including aortic stenosis or symptomatic mitral stenosis     Exercise Capacity:  · Able to walk 4 blocks without symptoms?: Yes  · Able to walk 2 flights without symptoms?: Yes    Prior Anesthesia Reactions: No     Personal history of venous thromboembolic disease? No    History of steroid use for >2 weeks within last year?  No         Review of Systems: Review of Systems   Constitutional: Negative  HENT: Negative  Eyes: Negative  Respiratory: Negative  Cardiovascular: Negative  Gastrointestinal: Negative  Endocrine: Negative  Genitourinary: Negative  Musculoskeletal: Positive for back pain  Skin: Negative  Allergic/Immunologic: Negative  Neurological: Negative  Hematological: Negative  Psychiatric/Behavioral: Negative          Current Problem List:     Patient Active Problem List   Diagnosis    Hyperlipidemia    Insomnia due to other mental disorder    Vitamin D deficiency    Current smoker    Hyperglycemia    Umbilical hernia without obstruction and without gangrene    Mondor's disease    Erectile dysfunction    Lumbar disc herniation    Lumbar foraminal stenosis       Allergies:     No Known Allergies    Current Medications:       Current Outpatient Medications:     atorvastatin (LIPITOR) 20 mg tablet, Take 1 tablet (20 mg total) by mouth daily, Disp: 90 tablet, Rfl: 1    Cholecalciferol (VITAMIN D3) 400 units CAPS, Take 1 capsule by mouth daily, Disp: , Rfl:     Past Medical History:       Past Medical History:   Diagnosis Date    Hiatal hernia     Onychomycosis of toenail         Past Surgical History:   Procedure Laterality Date    KNEE SURGERY          Family History   Problem Relation Age of Onset    Thyroid cancer Mother     Lung cancer Mother     Coronary artery disease Father     Hyperlipidemia Sister     Hyperlipidemia Brother     Substance Abuse Neg Hx     Mental illness Neg Hx         Social History     Socioeconomic History    Marital status: /Civil Union     Spouse name: Not on file    Number of children: Not on file    Years of education: Not on file    Highest education level: Not on file   Occupational History    Not on file   Tobacco Use    Smoking status: Current Every Day Smoker     Packs/day: 1 00     Years: 15 00     Pack years: 15 00     Types: Cigarettes    Smokeless tobacco: Never Used   Vaping Use    Vaping Use: Never used   Substance and Sexual Activity    Alcohol use: Yes     Comment: Rare     Drug use: No    Sexual activity: Not on file   Other Topics Concern    Not on file   Social History Narrative    Always uses a seatbelt    Caffeine use- 1 Cup of coffee per day    Has smoke detectors     Social Determinants of Health     Financial Resource Strain: Not on file   Food Insecurity: Not on file   Transportation Needs: Not on file   Physical Activity: Not on file   Stress: Not on file   Social Connections: Not on file   Intimate Partner Violence: Not on file   Housing Stability: Not on file        Physical Exam:     /80   Pulse 77   Resp 16   Ht 5' 8" (1 727 m)   Wt 98 8 kg (217 lb 12 8 oz)   BMI 33 12 kg/m²     Physical Exam  Constitutional:       Appearance: Normal appearance  He is well-developed and normal weight  HENT:      Head: Normocephalic and atraumatic  Right Ear: Hearing normal       Left Ear: Hearing normal    Eyes:      Extraocular Movements: Extraocular movements intact  Conjunctiva/sclera: Conjunctivae normal       Pupils: Pupils are equal, round, and reactive to light  Neck:      Thyroid: No thyromegaly  Vascular: No carotid bruit  Cardiovascular:      Rate and Rhythm: Normal rate and regular rhythm  Pulses: Normal pulses  Heart sounds: Normal heart sounds  No murmur heard  Pulmonary:      Effort: Pulmonary effort is normal  No respiratory distress  Breath sounds: Normal breath sounds  No wheezing or rales  Abdominal:      General: Abdomen is flat  Bowel sounds are normal  There is no distension  Palpations: Abdomen is soft  There is no mass  Tenderness: There is no abdominal tenderness  Musculoskeletal:         General: Normal range of motion  Cervical back: Normal range of motion and neck supple  Right lower leg: No edema  Left lower leg: No edema  Lymphadenopathy:      Cervical: No cervical adenopathy  Skin:     General: Skin is warm and dry  Neurological:      General: No focal deficit present  Mental Status: He is alert and oriented to person, place, and time  Cranial Nerves: No cranial nerve deficit  Deep Tendon Reflexes: Reflexes are normal and symmetric  Psychiatric:         Mood and Affect: Mood normal          Behavior: Behavior normal          Thought Content: Thought content normal          Judgment: Judgment normal           Data:     Pre-operative work-up    Laboratory Results: I have personally reviewed the pertinent laboratory results/reports      EKG: I have personally reviewed pertinent reports  Chest x-ray: I have personally reviewed pertinent reports  Previous cardiopulmonary studies within the past year:  · Echocardiogram: none  · Cardiac Catheterization: none  · Stress Test: none  · Pulmonary Function Testing: none      Assessment & Recommendations:         Pre-Op Evaluation Assessment  54 y o  male with planned surgery: L4/L5 microdiscectomy   Known risk factors for perioperative complications: None  smoking  Current medications which may produce withdrawal symptoms if withheld perioperatively: none  Pre-Op Evaluation Plan  1  Further preoperative workup as follows:   - None; no further preoperative work-up is required    2  Medication Management/Recommendations:   - None, continue medication regimen including morning of surgery, with sip of water    3  Prophylaxis for cardiac events with perioperative beta-blockers: not indicated  4  Patient requires further consultation with: None    Clearance  Patient is CLEARED for surgery without any additional cardiac testing       Marlon Mccormick PA-C  [place] [address]  [place] [address] [address] [facility] [address]  Phone#  [phone]  Fax#  [phone]

## 2022-10-09 LAB — COLOGUARD RESULT REPORTABLE: NEGATIVE

## 2023-02-14 DIAGNOSIS — E78.5 HYPERLIPIDEMIA, UNSPECIFIED HYPERLIPIDEMIA TYPE: ICD-10-CM

## 2023-02-14 RX ORDER — ATORVASTATIN CALCIUM 20 MG/1
TABLET, FILM COATED ORAL
Qty: 30 TABLET | Refills: 5 | Status: SHIPPED | OUTPATIENT
Start: 2023-02-14

## 2023-05-17 ENCOUNTER — OFFICE VISIT (OUTPATIENT)
Dept: FAMILY MEDICINE CLINIC | Facility: CLINIC | Age: 53
End: 2023-05-17

## 2023-05-17 VITALS
OXYGEN SATURATION: 98 % | WEIGHT: 221.9 LBS | HEART RATE: 80 BPM | HEIGHT: 68 IN | DIASTOLIC BLOOD PRESSURE: 80 MMHG | SYSTOLIC BLOOD PRESSURE: 130 MMHG | RESPIRATION RATE: 16 BRPM | BODY MASS INDEX: 33.63 KG/M2

## 2023-05-17 DIAGNOSIS — G89.29 CHRONIC RIGHT-SIDED THORACIC BACK PAIN: ICD-10-CM

## 2023-05-17 DIAGNOSIS — M54.6 CHRONIC RIGHT-SIDED THORACIC BACK PAIN: ICD-10-CM

## 2023-05-17 DIAGNOSIS — R10.9 RIGHT FLANK PAIN: Primary | ICD-10-CM

## 2023-05-17 LAB
SL AMB  POCT GLUCOSE, UA: NORMAL
SL AMB LEUKOCYTE ESTERASE,UA: NORMAL
SL AMB POCT BILIRUBIN,UA: NORMAL
SL AMB POCT BLOOD,UA: NORMAL
SL AMB POCT CLARITY,UA: CLEAR
SL AMB POCT COLOR,UA: YELLOW
SL AMB POCT KETONES,UA: NORMAL
SL AMB POCT NITRITE,UA: NORMAL
SL AMB POCT PH,UA: 5
SL AMB POCT SPECIFIC GRAVITY,UA: 1.01
SL AMB POCT URINE PROTEIN: NORMAL
SL AMB POCT UROBILINOGEN: 0.2

## 2023-05-17 NOTE — PROGRESS NOTES
Assessment/Plan:    1  Thoracic pain - right flank pain, urine negative, will start with an XR throacic spine  MRI from 4/2022 showed a bulging disc at T 11 and 12, most likely XR will show degenerative changes and will need PT or MRI    F/u pending results  F/u as needed      Subjective:   Chief Complaint   Patient presents with   • Chest Pain     R pain   Since the surgery since Sep      Patient ID: Eduardo Bone is a 50 y o  male  Patient with right back pain for 6 months, seems to be getting worse  Pain sharp, constant, if you push on it takes his breath away  Tried some ibuprofen  Hurst worse with movement  Urinating normally  No blood, freuquency, urgency, weight loss  Had recent lumbar fusion, has not heavy lifting/labor his entire life        The following portions of the patient's history were reviewed and updated as appropriate: allergies, current medications, past family history, past medical history, past social history, past surgical history and problem list     Past Medical History:   Diagnosis Date   • Hiatal hernia    • Onychomycosis of toenail      Past Surgical History:   Procedure Laterality Date   • KNEE SURGERY       Family History   Problem Relation Age of Onset   • Thyroid cancer Mother    • Lung cancer Mother    • Coronary artery disease Father    • Hyperlipidemia Sister    • Hyperlipidemia Brother    • Substance Abuse Neg Hx    • Mental illness Neg Hx      Social History     Socioeconomic History   • Marital status: /Civil Union     Spouse name: Not on file   • Number of children: Not on file   • Years of education: Not on file   • Highest education level: Not on file   Occupational History   • Not on file   Tobacco Use   • Smoking status: Every Day     Packs/day: 1 00     Years: 15 00     Pack years: 15 00     Types: Cigarettes   • Smokeless tobacco: Never   Vaping Use   • Vaping Use: Never used   Substance and Sexual Activity   • Alcohol use: Yes     Comment: Rare    • "Drug use: No   • Sexual activity: Not on file   Other Topics Concern   • Not on file   Social History Narrative    Always uses a seatbelt    Caffeine use- 1 Cup of coffee per day    Has smoke detectors     Social Determinants of Health     Financial Resource Strain: Not on file   Food Insecurity: Not on file   Transportation Needs: Not on file   Physical Activity: Not on file   Stress: Not on file   Social Connections: Not on file   Intimate Partner Violence: Not on file   Housing Stability: Not on file       Current Outpatient Medications:   •  atorvastatin (LIPITOR) 20 mg tablet, TAKE 1 TABLET BY MOUTH EVERY DAY, Disp: 30 tablet, Rfl: 5  •  Cholecalciferol (VITAMIN D3) 400 units CAPS, Take 1 capsule by mouth daily, Disp: , Rfl:     Review of Systems          Objective:    Vitals:    05/17/23 1321   BP: 130/80   Pulse: 80   Resp: 16   SpO2: 98%   Weight: 101 kg (221 lb 14 4 oz)   Height: 5' 8\" (1 727 m)        Physical Exam  Constitutional:       Appearance: He is well-developed  HENT:      Head: Normocephalic and atraumatic  Cardiovascular:      Rate and Rhythm: Normal rate and regular rhythm  Heart sounds: Normal heart sounds  Pulmonary:      Effort: Pulmonary effort is normal    Musculoskeletal:         General: Normal range of motion  Cervical back: Normal range of motion  Comments: Thoracic spine not tender, intercostal space of ribs 8-9-10 in right flank region tender to palpation   Skin:     General: Skin is warm  Neurological:      General: No focal deficit present  Mental Status: He is alert and oriented to person, place, and time                 "

## 2023-05-22 ENCOUNTER — HOSPITAL ENCOUNTER (OUTPATIENT)
Dept: RADIOLOGY | Facility: HOSPITAL | Age: 53
Discharge: HOME | End: 2023-05-22

## 2023-05-22 DIAGNOSIS — G89.29 CHRONIC RIGHT-SIDED THORACIC BACK PAIN: ICD-10-CM

## 2023-05-22 DIAGNOSIS — R10.9 RIGHT FLANK PAIN: ICD-10-CM

## 2023-05-22 DIAGNOSIS — M54.6 CHRONIC RIGHT-SIDED THORACIC BACK PAIN: ICD-10-CM

## 2023-06-06 DIAGNOSIS — G89.29 CHRONIC RIGHT-SIDED THORACIC BACK PAIN: Primary | ICD-10-CM

## 2023-06-06 DIAGNOSIS — M54.6 CHRONIC RIGHT-SIDED THORACIC BACK PAIN: Primary | ICD-10-CM

## 2023-06-23 ENCOUNTER — HOSPITAL ENCOUNTER (OUTPATIENT)
Dept: MRI IMAGING | Facility: HOSPITAL | Age: 53
End: 2023-06-23
Payer: COMMERCIAL

## 2023-06-23 DIAGNOSIS — M54.6 CHRONIC RIGHT-SIDED THORACIC BACK PAIN: ICD-10-CM

## 2023-06-23 DIAGNOSIS — G89.29 CHRONIC RIGHT-SIDED THORACIC BACK PAIN: ICD-10-CM

## 2023-06-23 PROCEDURE — 72146 MRI CHEST SPINE W/O DYE: CPT

## 2023-06-23 PROCEDURE — G1004 CDSM NDSC: HCPCS

## 2023-09-03 DIAGNOSIS — E78.5 HYPERLIPIDEMIA, UNSPECIFIED HYPERLIPIDEMIA TYPE: ICD-10-CM

## 2023-09-05 RX ORDER — ATORVASTATIN CALCIUM 20 MG/1
TABLET, FILM COATED ORAL
Qty: 30 TABLET | Refills: 5 | Status: SHIPPED | OUTPATIENT
Start: 2023-09-05

## 2023-09-17 ENCOUNTER — HOSPITAL ENCOUNTER (EMERGENCY)
Facility: HOSPITAL | Age: 53
Discharge: HOME | End: 2023-09-17
Attending: EMERGENCY MEDICINE | Admitting: EMERGENCY MEDICINE
Payer: COMMERCIAL

## 2023-09-17 VITALS
BODY MASS INDEX: 31.84 KG/M2 | HEART RATE: 82 BPM | DIASTOLIC BLOOD PRESSURE: 96 MMHG | RESPIRATION RATE: 16 BRPM | WEIGHT: 215 LBS | OXYGEN SATURATION: 96 % | HEIGHT: 69 IN | TEMPERATURE: 98.4 F | SYSTOLIC BLOOD PRESSURE: 162 MMHG

## 2023-09-17 DIAGNOSIS — H57.12 EYE PAIN, LEFT: Primary | ICD-10-CM

## 2023-09-17 PROCEDURE — 99283 EMERGENCY DEPT VISIT LOW MDM: CPT

## 2023-09-17 PROCEDURE — 99284 EMERGENCY DEPT VISIT MOD MDM: CPT | Performed by: EMERGENCY MEDICINE

## 2023-09-17 RX ORDER — ERYTHROMYCIN 5 MG/G
0.5 OINTMENT OPHTHALMIC
Status: DISCONTINUED | OUTPATIENT
Start: 2023-09-17 | End: 2023-09-17 | Stop reason: HOSPADM

## 2023-09-17 RX ORDER — TETRACAINE HYDROCHLORIDE 5 MG/ML
1 SOLUTION OPHTHALMIC ONCE
Status: COMPLETED | OUTPATIENT
Start: 2023-09-17 | End: 2023-09-17

## 2023-09-17 RX ADMIN — FLUORESCEIN SODIUM 1 STRIP: 1 STRIP OPHTHALMIC at 01:32

## 2023-09-17 RX ADMIN — ERYTHROMYCIN 0.5 INCH: 5 OINTMENT OPHTHALMIC at 01:54

## 2023-09-17 RX ADMIN — TETRACAINE HYDROCHLORIDE 1 DROP: 5 SOLUTION OPHTHALMIC at 01:54

## 2023-09-17 NOTE — DISCHARGE INSTRUCTIONS
Please use the erythromycin ointment every 4 hours while awake for the next 5 days, please be evaluated by ophthalmology optometry, if symptoms worsen please return to the emergency department

## 2023-09-17 NOTE — ED PROVIDER NOTES
History  Chief Complaint   Patient presents with   • Eye Pain     Sitting on couch watching TV and blinked then L eye felt like something was in it and burning, pt flushed eye with no relief     72-year-old male with past medical history of hyperlipidemia on Lipitor presents for evaluation of acute onset feeling of foreign body in left eye, eye burning that started while he was watching TV. No vision changes, no specific trauma to the eye, does not wear contacts          Prior to Admission Medications   Prescriptions Last Dose Informant Patient Reported? Taking? Cholecalciferol (VITAMIN D3) 400 units CAPS  Self Yes No   Sig: Take 1 capsule by mouth daily   atorvastatin (LIPITOR) 20 mg tablet   No No   Sig: TAKE 1 TABLET BY MOUTH EVERY DAY      Facility-Administered Medications: None       Past Medical History:   Diagnosis Date   • Hiatal hernia    • Onychomycosis of toenail        Past Surgical History:   Procedure Laterality Date   • KNEE SURGERY         Family History   Problem Relation Age of Onset   • Thyroid cancer Mother    • Lung cancer Mother    • Coronary artery disease Father    • Hyperlipidemia Sister    • Hyperlipidemia Brother    • Substance Abuse Neg Hx    • Mental illness Neg Hx      I have reviewed and agree with the history as documented. E-Cigarette/Vaping   • E-Cigarette Use Never User      E-Cigarette/Vaping Substances     Social History     Tobacco Use   • Smoking status: Every Day     Packs/day: 1.00     Years: 15.00     Total pack years: 15.00     Types: Cigarettes   • Smokeless tobacco: Never   Vaping Use   • Vaping Use: Never used   Substance Use Topics   • Alcohol use: Yes     Comment: Rare    • Drug use: No       Review of Systems   Constitutional: Negative for appetite change, chills and fever. HENT: Negative for rhinorrhea and sore throat. Eyes: Positive for photophobia and pain. Negative for redness, itching and visual disturbance.    Respiratory: Negative for cough and shortness of breath. Cardiovascular: Negative for chest pain and palpitations. Gastrointestinal: Negative for abdominal pain and diarrhea. Genitourinary: Negative for dysuria, frequency and urgency. Skin: Negative for rash. Neurological: Negative for dizziness and weakness. All other systems reviewed and are negative. Physical Exam  Physical Exam  Vitals and nursing note reviewed. Constitutional:       Appearance: He is well-developed. HENT:      Head: Normocephalic and atraumatic. Right Ear: External ear normal.      Left Ear: External ear normal.   Eyes:      General: Lids are normal. Lids are everted, no foreign bodies appreciated. Vision grossly intact. Gaze aligned appropriately. Right eye: No foreign body, discharge or hordeolum. Left eye: No foreign body, discharge or hordeolum. Intraocular pressure: Right eye pressure is 18 mmHg. Left eye pressure is 18 mmHg. Measurements were taken using a handheld tonometer. Extraocular Movements: Extraocular movements intact. Conjunctiva/sclera: Conjunctivae normal.      Pupils: Pupils are equal, round, and reactive to light. Comments: On fluorescein staining faint corneal deficits noted inferior aspect of the eye, no ulceration   Neck:      Vascular: No JVD. Trachea: No tracheal deviation. Cardiovascular:      Rate and Rhythm: Normal rate and regular rhythm. Heart sounds: Normal heart sounds. No murmur heard. No friction rub. No gallop. Pulmonary:      Effort: Pulmonary effort is normal. No respiratory distress. Breath sounds: No stridor. No wheezing or rales. Abdominal:      General: There is no distension. Palpations: Abdomen is soft. There is no mass. Tenderness: There is no abdominal tenderness. There is no guarding or rebound. Musculoskeletal:         General: Normal range of motion. Cervical back: Normal range of motion and neck supple.    Skin:     General: Skin is warm and dry. Coloration: Skin is not pale. Findings: No erythema or rash. Neurological:      Mental Status: He is alert and oriented to person, place, and time. Cranial Nerves: No cranial nerve deficit. Vital Signs  ED Triage Vitals [09/17/23 0123]   Temperature Pulse Respirations Blood Pressure SpO2   98.4 °F (36.9 °C) 82 16 162/96 96 %      Temp Source Heart Rate Source Patient Position - Orthostatic VS BP Location FiO2 (%)   Temporal Monitor Lying Left arm --      Pain Score       10 - Worst Possible Pain           Vitals:    09/17/23 0123   BP: 162/96   Pulse: 82   Patient Position - Orthostatic VS: Lying         Visual Acuity      ED Medications  Medications   tetracaine 0.5 % ophthalmic solution 1 drop (has no administration in time range)   erythromycin (ILOTYCIN) 0.5 % ophthalmic ointment 0.5 inch (has no administration in time range)   fluorescein sodium sterile ophthalmic strip 1 strip (1 strip Both Eyes Given by Other 9/17/23 0132)       Diagnostic Studies  Results Reviewed     None                 No orders to display              Procedures  Procedures         ED Course  ED Course as of 09/17/23 0147   Sun Sep 17, 2023   0128 Notes reviewed, patient last seen by PCP 5/17/2023 for right-sided thoracic back pain likely believed to be musculoskeletal, imaging ordered, PT                                             MDM    Disposition  Final diagnoses:   Eye pain, left     Time reflects when diagnosis was documented in both MDM as applicable and the Disposition within this note     Time User Action Codes Description Comment    9/17/2023  1:44 AM Paula Clinton Add [H57.12] Eye pain, left       ED Disposition     ED Disposition   Discharge    Condition   Stable    Date/Time   Sun Sep 17, 2023  1:44 AM    Comment   [place] [facility] [place] discharge to home/self care.                Follow-up Information     Follow up With Specialties Details Why Contact Info Additional Information    Jacqueline Cheng Rosanne Martel PA-C Family Medicine, Physician Assistant Schedule an appointment as soon as possible for a visit   [address] [facility], [address] Emergency Department Emergency Medicine  If symptoms worsen [address] [facility] [phone]  [address] [facility] Emergency Department, [address] [facility] [address] [facility] Schedule an appointment as soon as possible for a visit   [address] [facility]  [phone]             Patient's Medications   Discharge Prescriptions    No medications on file       No discharge procedures on file.     PDMP Review     None          ED Provider  Electronically Signed by           Barrera Montoya DO  09/17/23 0147

## 2024-03-15 ENCOUNTER — APPOINTMENT (OUTPATIENT)
Dept: RADIOLOGY | Facility: CLINIC | Age: 54
End: 2024-03-15
Payer: COMMERCIAL

## 2024-03-15 ENCOUNTER — OFFICE VISIT (OUTPATIENT)
Dept: OBGYN CLINIC | Facility: CLINIC | Age: 54
End: 2024-03-15
Payer: COMMERCIAL

## 2024-03-15 VITALS
HEART RATE: 71 BPM | BODY MASS INDEX: 32.73 KG/M2 | DIASTOLIC BLOOD PRESSURE: 84 MMHG | WEIGHT: 221 LBS | HEIGHT: 69 IN | SYSTOLIC BLOOD PRESSURE: 128 MMHG

## 2024-03-15 DIAGNOSIS — M16.11 PRIMARY OSTEOARTHRITIS OF ONE HIP, RIGHT: Primary | ICD-10-CM

## 2024-03-15 DIAGNOSIS — M25.551 RIGHT HIP PAIN: ICD-10-CM

## 2024-03-15 PROCEDURE — 99203 OFFICE O/P NEW LOW 30 MIN: CPT | Performed by: STUDENT IN AN ORGANIZED HEALTH CARE EDUCATION/TRAINING PROGRAM

## 2024-03-15 PROCEDURE — 73502 X-RAY EXAM HIP UNI 2-3 VIEWS: CPT

## 2024-03-15 RX ORDER — MELOXICAM 7.5 MG/1
7.5 TABLET ORAL DAILY
Qty: 30 TABLET | Refills: 2 | Status: SHIPPED | OUTPATIENT
Start: 2024-03-15

## 2024-03-15 NOTE — PROGRESS NOTES
Hip New Office Note    Assessment:     1. Primary osteoarthritis of one hip, right    2. Right hip pain        Plan:     Problem List Items Addressed This Visit    None  Visit Diagnoses       Primary osteoarthritis of one hip, right    -  Primary    Right hip pain        Relevant Medications    meloxicam (Mobic) 7.5 mg tablet    Other Relevant Orders    XR hip/pelv 2-3 vws right if performed           Findings today are consistent with right hip osteoarthritis. Imaging and prognosis was reviewed with the patient today. Discussed treatment options including continued observation, low impact exercises, anti-inflammatories, physical therapy, intra-articular cortisone injection, versus surgical intervention. Exercises provided. Mobic prescribed to control pain as needed. Follow up as needed.    Subjective:     Patient ID: Obi Marie is a 53 y.o. male.    Patient seen in consultation at the request of Saima Corbin PA-C     Chief Complaint:  HPI:  53 y.o. male presents to the office for evaluation of right hip pain. He notes that his pain came on after his lumbar spine surgery roughly 2 years ago. He is experiencing right groin pain made worse with activities including ambulation. He describes feelings of electrical and sharp pain. He denies any radiating pain down his leg, numbness, or tingling.     Allergy:  No Known Allergies  Medications:  all current active meds have been reviewed  Past Medical History:  Past Medical History:   Diagnosis Date    Hiatal hernia     Onychomycosis of toenail      Past Surgical History:  Past Surgical History:   Procedure Laterality Date    KNEE SURGERY       Family History:  Family History   Problem Relation Age of Onset    Thyroid cancer Mother     Lung cancer Mother     Coronary artery disease Father     Hyperlipidemia Sister     Hyperlipidemia Brother     Substance Abuse Neg Hx     Mental illness Neg Hx      Social History:  Social History     Substance and Sexual Activity  "  Alcohol Use Yes    Comment: Rare      Social History     Substance and Sexual Activity   Drug Use No     Social History     Tobacco Use   Smoking Status Every Day    Current packs/day: 1.00    Average packs/day: 1 pack/day for 15.0 years (15.0 ttl pk-yrs)    Types: Cigarettes   Smokeless Tobacco Never         ROS:  General: Per HPI  Skin: Negative, except if noted below  HEENT: Negative  Respiratory: Negative  Cardiovascular: Negative  Gastrointestinal: Negative  Urinary: Negative  Vascular: Negative  Musculoskeletal: Positive per HPI   Neurologic: Positive per HPI  Endocrine: Negative    Objective:  BP Readings from Last 1 Encounters:   03/15/24 128/84      Wt Readings from Last 1 Encounters:   03/15/24 100 kg (221 lb)        Respiratory:   non-labored respirations    Lymphatics:  no palpable lymph nodes    Gait and Station:   antalgic    Neurologic:   Alert and oriented times 3  Patient with normal sensation except as noted below  Deep tendon reflexes 2+ except as noted in MSK exam    Bilateral Lower Extremity:  Left Hip     Inspection: skin intact    Range of Motion: full without pain    - log roll    - Trendelenburg sign    Motor: 5/5 Q/HS/TA/GS/P    Pulses: 2+ DP / 2+ PT    SILT DP/SP/S/S/TN    Right Hip     Inspection: skin intact    Range of Motion: full with pain in extremes of internal and external rotation    + log roll    - Trendelenburg sign    Motor: 5/5 Q/HS/TA/GS/P    Pulses: 2+ DP / 2+ PT    SILT DP/SP/S/S/TN    Imaging:  My interpretation XR AP pelvis/ right hip: moderate joint space narrowing, subchondral sclerosis, subchondral cysts, osteophyte formation. No fracture or dislocation.     BMI:   Estimated body mass index is 32.64 kg/m² as calculated from the following:    Height as of this encounter: 5' 9\" (1.753 m).    Weight as of this encounter: 100 kg (221 lb).  BSA:   Estimated body surface area is 2.15 meters squared as calculated from the following:    Height as of this encounter: 5' 9\" " (1.753 m).    Weight as of this encounter: 100 kg (221 lb).           Scribe Attestation      I,:  Allison Crawford am acting as a scribe while in the presence of the attending physician.:       I,:  Moe Neves, DO personally performed the services described in this documentation    as scribed in my presence.:

## 2024-03-15 NOTE — PATIENT INSTRUCTIONS
Hip Bursitis Exercises   WHAT YOU NEED TO KNOW:   What do I need to know about hip bursitis exercises?  Hip bursitis exercises help strengthen the muscles in your hip and keep the joint flexible. Strong muscles can help reduce pain, prevent injury, and keep the joint stable. The exercises can also help increase the range of motion in your hip joint.       What do I need to know about exercise safety?   Move slowly and smoothly.  Avoid fast or jerky motions. This will help prevent an injury.    Breathe normally.  Do not hold your breath. It is important to breathe in and out so you do not tense up during exercise. Tension could prevent you from moving your joint in a full range of motion.    Do the exercises and stretches on both legs.  This helps both hips remain strong and flexible.    Stop if you feel sharp pain or an increase in pain.  Contact your healthcare provider or physical therapist. It is normal to feel some discomfort during exercise. Regular exercise will help decrease your discomfort over time.    Warm up and cool down when you exercise.  Walk or ride a stationary bike for 5 to 10 minutes before you start. This warms and relaxes your muscles to help prevent an injury. When you have finished the exercises, cool down by walking in place for a few minutes. You may also need to stretch as part of your warm up or cool down routine. Your provider or physical therapist will tell you which stretches to do.       How should I do hip stretches?  Your healthcare provider or physical therapist will tell you how many times to do each stretch. Do the stretch on both sides before you move to the next stretch.  Standing iliotibial band stretch:  Stand with the leg on your injured side behind your other leg. Bend sideways toward the side that is not injured. Stop when you feel a stretch in your outer hip. Hold for 5 to 10 seconds. Then return to the starting position.         Lying iliotibial band stretch:  Lie on your  back. Bend the knee on your injured side toward your chest. Place your hands on the outside of your knee and thigh. Slowly pull the knee across your body. Stop when you feel a stretch in your hip and outer thigh. Hold for 5 to 10 seconds. Return your leg to the starting position.         Hip stretch:  Lie on your back with both legs straight and on the ground. Bend the knee on your injured side toward your chest until you can reach your lower leg. Place both hands on your shin and pull your knee toward your chest. Hold for 5 to 10 seconds. Return your leg to the starting position.         Knee to chest:  Lie on your back with both knees bent and feet flat on the floor. Bend the knee on your injured side toward your chest until you can reach your lower leg. Place both hands on your shin and pull your knee toward your chest. Hold for 5 to 10 seconds. Return your leg to the starting position.         Internal hip rotator stretch:  You will do this exercise on a table. Lie on your side with the injured hip on top. You may be told to keep a pillow between your thighs. Move the top leg so the foot hangs below the edge of the table. Rotate your hip to raise your foot in the opposite direction of the bottom shoulder. Raise your foot as high as you can so you feel a stretch in the back of your thigh. Hold for 5 seconds. Then slowly lower your foot to the starting position.    External hip rotator stretch:  You will do this exercise on a table. Lie on your side with the injured hip on the bottom. You do not need a pillow between your thighs for this exercise. Move the bottom leg so the foot is off the edge of the table. Rotate your hip to lift the foot in the opposite direction of the bottom shoulder. Raise your foot as high as you can so you feel a stretch in your buttock. Hold for 5 seconds. Then slowly lower your foot to the starting position.    Kneeling hip flexor stretch:  Kneel on your knee on the injured side. Place  the foot of your other leg on the floor so the knee is bent. Put both hands on top of your thigh. Keep your back straight and abdominal muscles tight. Lean forward until you feel a stretch in your other thigh. Hold the stretch for 10 seconds. Return to the starting position.       How should I do hip strengthening exercises?  Your healthcare provider or physical therapist will tell you how many times to do each exercise. Do the exercise on both sides before you move to the next exercise.  Straight leg lift to the side:  This may also be called hip abduction. Lie on your side with straight legs, with the injured hip on top. Slowly raise your top leg toward the ceiling as high as you can. Keep your foot pointed. Hold for 5 seconds. Then slowly lower your leg to the starting position.         Inner thigh lift:  This may also be called hip adduction. Lie on your side with straight legs, with the injured hip on the bottom. Cross your top leg over your bottom leg. Put the foot of your top leg on the floor in front of you. Raise your bottom leg until it touches the top leg. Hold for 5 seconds. Then slowly lower the leg to the floor.         Clam exercise:  Lie on your side so your injured side is on top. Bend your knees. Keep your heels together during this exercise. Slowly raise your top knee toward the ceiling. Then lower your leg so your knees are together.       When should I call my doctor or physical therapist?   You have sharp pain during exercise or at rest.    You have questions or concerns about the stretches or exercises.    CARE AGREEMENT:   You have the right to help plan your care. Learn about your health condition and how it may be treated. Discuss treatment options with your healthcare providers to decide what care you want to receive. You always have the right to refuse treatment. The above information is an  only. It is not intended as medical advice for individual conditions or treatments.  Talk to your doctor, nurse or pharmacist before following any medical regimen to see if it is safe and effective for you.  © Copyright [employer] 2023 Information is for End User's use only and may not be sold, redistributed or otherwise used for commercial purposes.

## 2024-09-06 ENCOUNTER — OFFICE VISIT (OUTPATIENT)
Dept: FAMILY MEDICINE CLINIC | Facility: CLINIC | Age: 54
End: 2024-09-06
Payer: COMMERCIAL

## 2024-09-06 VITALS
RESPIRATION RATE: 16 BRPM | TEMPERATURE: 98.7 F | DIASTOLIC BLOOD PRESSURE: 70 MMHG | BODY MASS INDEX: 31.1 KG/M2 | HEIGHT: 69 IN | OXYGEN SATURATION: 97 % | WEIGHT: 210 LBS | SYSTOLIC BLOOD PRESSURE: 122 MMHG | HEART RATE: 77 BPM

## 2024-09-06 DIAGNOSIS — Z23 ENCOUNTER FOR IMMUNIZATION: ICD-10-CM

## 2024-09-06 DIAGNOSIS — K42.9 UMBILICAL HERNIA WITHOUT OBSTRUCTION OR GANGRENE: Primary | ICD-10-CM

## 2024-09-06 PROCEDURE — 99213 OFFICE O/P EST LOW 20 MIN: CPT | Performed by: PHYSICIAN ASSISTANT

## 2024-09-06 NOTE — PROGRESS NOTES
Assessment/Plan:    Umbilical hernia - refer to general surgeon  Hyperlipidemia - stopped simvastatin, refusing treatment at this time    Refusing tdap at this time    F/u as needed    Subjective:   Chief Complaint   Patient presents with    Physical Exam    Hernia     Would like to discuss moving forward with umbilical hernia repair       Patient ID: Obi Marie is a 53 y.o. male.    Patient here with umbilical hernia for 25 years, recently becoming uncomfortable with lifting, eating, can still reduce it with laying flat. Moving bowels normally.        The following portions of the patient's history were reviewed and updated as appropriate: allergies, current medications, past family history, past medical history, past social history, past surgical history, and problem list.    Past Medical History:   Diagnosis Date    Hiatal hernia     Onychomycosis of toenail      Past Surgical History:   Procedure Laterality Date    KNEE SURGERY       Family History   Problem Relation Age of Onset    Thyroid cancer Mother     Lung cancer Mother     Coronary artery disease Father     Hyperlipidemia Sister     Hyperlipidemia Brother     Substance Abuse Neg Hx     Mental illness Neg Hx      Social History     Socioeconomic History    Marital status: /Civil Union     Spouse name: Not on file    Number of children: Not on file    Years of education: Not on file    Highest education level: Not on file   Occupational History    Not on file   Tobacco Use    Smoking status: Every Day     Current packs/day: 1.00     Average packs/day: 1 pack/day for 15.0 years (15.0 ttl pk-yrs)     Types: Cigarettes    Smokeless tobacco: Never   Vaping Use    Vaping status: Never Used   Substance and Sexual Activity    Alcohol use: Yes     Comment: Rare     Drug use: No    Sexual activity: Not on file   Other Topics Concern    Not on file   Social History Narrative    Always uses a seatbelt    Caffeine use- 1 Cup of coffee per day    Has  "smoke detectors     Social Determinants of Health     Financial Resource Strain: Not on file   Food Insecurity: Not on file   Transportation Needs: Not on file   Physical Activity: Not on file   Stress: Not on file   Social Connections: Not on file   Intimate Partner Violence: Not on file   Housing Stability: Not on file       Current Outpatient Medications:     atorvastatin (LIPITOR) 20 mg tablet, TAKE 1 TABLET BY MOUTH EVERY DAY, Disp: 30 tablet, Rfl: 5    Cholecalciferol (VITAMIN D3) 400 units CAPS, Take 1 capsule by mouth daily, Disp: , Rfl:     meloxicam (Mobic) 7.5 mg tablet, Take 1 tablet (7.5 mg total) by mouth daily (Patient not taking: Reported on 9/6/2024), Disp: 30 tablet, Rfl: 2    Review of Systems          Objective:    Vitals:    09/06/24 1428   BP: 122/70   Pulse: 77   Resp: 16   Temp: 98.7 °F (37.1 °C)   TempSrc: Temporal   SpO2: 97%   Weight: 95.3 kg (210 lb)   Height: 5' 9\" (1.753 m)        Physical Exam  Constitutional:       Appearance: Normal appearance.   HENT:      Head: Normocephalic and atraumatic.   Abdominal:      Comments: Umbilical hernia, not tender, easily reducible   Neurological:      General: No focal deficit present.      Mental Status: He is alert and oriented to person, place, and time.   Psychiatric:         Mood and Affect: Mood normal.         Behavior: Behavior normal.         Thought Content: Thought content normal.         Judgment: Judgment normal.               "

## 2024-09-13 NOTE — PROGRESS NOTES
Assessment/Plan:   Obi Marie is a 53 y.o.male who is here for evaluation of umbilical hernia.  Patient with long history of pain and bulging at umbilicus.  States he has probably had hernia there for approximately 20 years.  Recently he has noticed more discomfort at site and bulge has become larger.  Denies any difficulty with eating, nausea or vomiting, difficulty with bowel movements or bloody stools.  Pain is worse with activity and lifting or at the end of the day.    Umbilical hernia  -Hernia is reducible on exam with small defect    Tobacco abuse  -Currently smoking approximately 1/2 packs/day      Plan: Open repair of umbilical hernia  -Procedure discussed with patient in detail as well as possible risks and complications and written consent has been obtained.  All questions answered  -Patient will require preadmission testing.  Has been ordered at today's visit which includes blood work, chest x-ray, EKG.  Will be reviewed prior to surgical intervention.    -Recommend smoking cessation  -Discussed that smoking can increase risks of postoperative complications including hernia recurrence and healing difficulty    Preoperative Clearance: None    HPI:  Obi Marie is a 53 y.o.male who was referred for evaluation of umbilical hernia as above.    Patient with long history of pain and bulging at umbilicus.  States he has probably had hernia there for approximately 20 years.  Recently he has noticed more discomfort at site and bulge has become larger.  Denies any difficulty with eating, nausea or vomiting, difficulty with bowel movements or bloody stools.  Pain is worse with activity and lifting or at the end of the day.  Nuys any pain or bulging in groin areas.    Patient with history of high cholesterol.  States he is stopped taking his cholesterol medication.  Notes some arthritis.  Otherwise denies significant medical history.  He is a smoker.  Denies any chest pain or shortness of breath.  No  prior history of abdominal surgery.  No anticoagulation medications.  No history of anesthesia complications.      ROS:  General ROS: negative  negative for - chills, fatigue, fever or night sweats, weight loss  Respiratory ROS: no cough, shortness of breath, or wheezing  Cardiovascular ROS: no chest pain or dyspnea on exertion  Abdomen ROS: as per HPI  Genito-Urinary ROS: no dysuria, trouble voiding, or hematuria  Musculoskeletal ROS: negative for - gait disturbance, joint pain or muscle pain  Neurological ROS: no TIA or stroke symptoms  Skin ROS: no rashes, lesions, open wounds      ALLERGIES  Patient has no known allergies.    Current Outpatient Medications:     atorvastatin (LIPITOR) 20 mg tablet, TAKE 1 TABLET BY MOUTH EVERY DAY, Disp: 30 tablet, Rfl: 5    Cholecalciferol (VITAMIN D3) 400 units CAPS, Take 1 capsule by mouth daily, Disp: , Rfl:     meloxicam (Mobic) 7.5 mg tablet, Take 1 tablet (7.5 mg total) by mouth daily (Patient not taking: Reported on 9/6/2024), Disp: 30 tablet, Rfl: 2  Past Medical History:   Diagnosis Date    Hiatal hernia     Onychomycosis of toenail      Past Surgical History:   Procedure Laterality Date    KNEE SURGERY     Lumbar surgery    Family History   Problem Relation Age of Onset    Thyroid cancer Mother     Lung cancer Mother     Coronary artery disease Father     Hyperlipidemia Sister     Hyperlipidemia Brother     Substance Abuse Neg Hx     Mental illness Neg Hx       reports that he has been smoking cigarettes. He has a 15 pack-year smoking history. He has never used smokeless tobacco. He reports current alcohol use. He reports that he does not use drugs.  Occasional mariajuana    Lab Results   Component Value Date    WBC 10.2 04/22/2021    HGB 16.6 04/22/2021    HCT 47.6 04/22/2021    MCV 90.3 04/22/2021     04/22/2021     Lab Results   Component Value Date     02/27/2017    SODIUM 137 04/22/2021    K 4.4 04/22/2021     04/22/2021    CO2 27 04/22/2021     BUN 16 04/22/2021    CREATININE 0.86 04/22/2021    GLUC 93 04/22/2021    CALCIUM 9.4 04/22/2021    AST 19 08/03/2022    ALT 19 08/03/2022    ALKPHOS 80 08/03/2022    PROT 6.2 02/27/2017    TP 6.3 08/03/2022    BILITOT 0.3 02/27/2017    TBILI 0.6 08/03/2022     Lab Results   Component Value Date    CHOLESTEROL 259 (H) 08/03/2022    CHOLESTEROL 266 (H) 04/22/2021    CHOLESTEROL 261 (H) 04/28/2018     Lab Results   Component Value Date    HDL 59 08/03/2022    HDL 60 04/22/2021    HDL 56 04/28/2018     Lab Results   Component Value Date    TRIG 120 08/03/2022    TRIG 107 04/22/2021    TRIG 90 04/28/2018     Lab Results   Component Value Date    NONHDLC 137 01/11/2014          PHYSICAL EXAM  Vitals:    09/16/24 1340   BP: 114/78   Pulse: 73   Temp: 98.1 °F (36.7 °C)     Weight (last 2 days)       Date/Time Weight    09/16/24 1340 94.4 (208.2)            General Appearance:    Alert, cooperative, no distress, pleasant   Head:    Normocephalic without obvious abnormality   Eyes:    Conjunctiva/corneas clear, EOM's intact        Neck:   Supple, no adenopathy, no JVD   Back:     Symmetric, no spinal or CVA tenderness   Lungs:     Clear to auscultation bilaterally, no wheezing or rhonchi   Heart:    Regular rate and rhythm, S1 and S2 normal, no murmur   Abdomen:   Flat, soft, active bowel sounds  Reducible umbilical hernia with approximate 1 cm defect, mild tenderness  Inguinal exam with weakness but no definitive hernia bilaterally   Extremities:   Extremities normal. No clubbing, cyanosis or edema   Psych:   Normal Affect, AOx3.    Neurologic:  Skin:   CNII-XII intact. Strength symmetric, speech intact    Warm, dry, intact, no visible rashes or lesions           Rosa M Hardin

## 2024-09-16 ENCOUNTER — OFFICE VISIT (OUTPATIENT)
Dept: SURGERY | Facility: HOSPITAL | Age: 54
End: 2024-09-16
Payer: COMMERCIAL

## 2024-09-16 VITALS
BODY MASS INDEX: 30.84 KG/M2 | HEIGHT: 69 IN | DIASTOLIC BLOOD PRESSURE: 78 MMHG | TEMPERATURE: 98.1 F | WEIGHT: 208.2 LBS | HEART RATE: 73 BPM | SYSTOLIC BLOOD PRESSURE: 114 MMHG

## 2024-09-16 DIAGNOSIS — K42.9 UMBILICAL HERNIA WITHOUT OBSTRUCTION OR GANGRENE: Primary | ICD-10-CM

## 2024-09-16 PROCEDURE — 99203 OFFICE O/P NEW LOW 30 MIN: CPT | Performed by: PHYSICIAN ASSISTANT

## 2024-09-16 RX ORDER — CEFAZOLIN SODIUM 2 G/50ML
2000 SOLUTION INTRAVENOUS ONCE
OUTPATIENT
Start: 2024-12-03 | End: 2024-12-03

## 2024-09-16 RX ORDER — SODIUM CHLORIDE, SODIUM LACTATE, POTASSIUM CHLORIDE, CALCIUM CHLORIDE 600; 310; 30; 20 MG/100ML; MG/100ML; MG/100ML; MG/100ML
125 INJECTION, SOLUTION INTRAVENOUS CONTINUOUS
OUTPATIENT
Start: 2024-12-03

## 2024-09-22 LAB
ALBUMIN SERPL-MCNC: 4.4 G/DL (ref 3.6–5.1)
ALBUMIN/GLOB SERPL: 1.8 (CALC) (ref 1–2.5)
ALP SERPL-CCNC: 81 U/L (ref 35–144)
ALT SERPL-CCNC: 17 U/L (ref 9–46)
APPEARANCE UR: CLEAR
AST SERPL-CCNC: 16 U/L (ref 10–35)
BACTERIA UR QL AUTO: NORMAL /HPF
BASOPHILS # BLD AUTO: 49 CELLS/UL (ref 0–200)
BASOPHILS NFR BLD AUTO: 0.6 %
BILIRUB SERPL-MCNC: 0.5 MG/DL (ref 0.2–1.2)
BILIRUB UR QL STRIP: NEGATIVE
BUN SERPL-MCNC: 21 MG/DL (ref 7–25)
BUN/CREAT SERPL: NORMAL (CALC) (ref 6–22)
CALCIUM SERPL-MCNC: 9.4 MG/DL (ref 8.6–10.3)
CHLORIDE SERPL-SCNC: 104 MMOL/L (ref 98–110)
CO2 SERPL-SCNC: 27 MMOL/L (ref 20–32)
COLOR UR: YELLOW
CREAT SERPL-MCNC: 0.94 MG/DL (ref 0.7–1.3)
EOSINOPHIL # BLD AUTO: 320 CELLS/UL (ref 15–500)
EOSINOPHIL NFR BLD AUTO: 3.9 %
ERYTHROCYTE [DISTWIDTH] IN BLOOD BY AUTOMATED COUNT: 13.1 % (ref 11–15)
GFR/BSA.PRED SERPLBLD CYS-BASED-ARV: 98 ML/MIN/1.73M2
GLOBULIN SER CALC-MCNC: 2.5 G/DL (CALC) (ref 1.9–3.7)
GLUCOSE SERPL-MCNC: 94 MG/DL (ref 65–99)
GLUCOSE UR QL STRIP: NEGATIVE
HCT VFR BLD AUTO: 48.5 % (ref 38.5–50)
HGB BLD-MCNC: 16 G/DL (ref 13.2–17.1)
HGB UR QL STRIP: NEGATIVE
HYALINE CASTS #/AREA URNS LPF: NORMAL /LPF
KETONES UR QL STRIP: NEGATIVE
LEUKOCYTE ESTERASE UR QL STRIP: NEGATIVE
LYMPHOCYTES # BLD AUTO: 1927 CELLS/UL (ref 850–3900)
LYMPHOCYTES NFR BLD AUTO: 23.5 %
MCH RBC QN AUTO: 30.4 PG (ref 27–33)
MCHC RBC AUTO-ENTMCNC: 33 G/DL (ref 32–36)
MCV RBC AUTO: 92 FL (ref 80–100)
MONOCYTES # BLD AUTO: 697 CELLS/UL (ref 200–950)
MONOCYTES NFR BLD AUTO: 8.5 %
NEUTROPHILS # BLD AUTO: 5207 CELLS/UL (ref 1500–7800)
NEUTROPHILS NFR BLD AUTO: 63.5 %
NITRITE UR QL STRIP: NEGATIVE
PH UR STRIP: 7 [PH] (ref 5–8)
PLATELET # BLD AUTO: 213 THOUSAND/UL (ref 140–400)
PMV BLD REES-ECKER: 11.9 FL (ref 7.5–12.5)
POTASSIUM SERPL-SCNC: 4.4 MMOL/L (ref 3.5–5.3)
PROT SERPL-MCNC: 6.9 G/DL (ref 6.1–8.1)
PROT UR QL STRIP: NEGATIVE
RBC # BLD AUTO: 5.27 MILLION/UL (ref 4.2–5.8)
RBC #/AREA URNS HPF: NORMAL /HPF
SODIUM SERPL-SCNC: 139 MMOL/L (ref 135–146)
SP GR UR STRIP: 1.01 (ref 1–1.03)
SQUAMOUS #/AREA URNS HPF: NORMAL /HPF
WBC # BLD AUTO: 8.2 THOUSAND/UL (ref 3.8–10.8)
WBC #/AREA URNS HPF: NORMAL /HPF

## 2024-11-11 ENCOUNTER — OFFICE VISIT (OUTPATIENT)
Dept: SURGERY | Facility: HOSPITAL | Age: 54
End: 2024-11-11
Payer: COMMERCIAL

## 2024-11-11 VITALS
DIASTOLIC BLOOD PRESSURE: 73 MMHG | WEIGHT: 212 LBS | HEART RATE: 76 BPM | TEMPERATURE: 97.3 F | SYSTOLIC BLOOD PRESSURE: 111 MMHG | BODY MASS INDEX: 31.4 KG/M2 | HEIGHT: 69 IN

## 2024-11-11 DIAGNOSIS — K42.9 UMBILICAL HERNIA WITHOUT OBSTRUCTION AND WITHOUT GANGRENE: Primary | ICD-10-CM

## 2024-11-11 PROCEDURE — 99213 OFFICE O/P EST LOW 20 MIN: CPT | Performed by: SURGERY

## 2024-11-13 ENCOUNTER — OFFICE VISIT (OUTPATIENT)
Dept: LAB | Facility: HOSPITAL | Age: 54
End: 2024-11-13
Payer: COMMERCIAL

## 2024-11-13 ENCOUNTER — HOSPITAL ENCOUNTER (OUTPATIENT)
Dept: RADIOLOGY | Facility: HOSPITAL | Age: 54
Discharge: HOME | End: 2024-11-13
Payer: COMMERCIAL

## 2024-11-13 DIAGNOSIS — K42.9 UMBILICAL HERNIA WITHOUT OBSTRUCTION OR GANGRENE: ICD-10-CM

## 2024-11-13 PROCEDURE — 93005 ELECTROCARDIOGRAM TRACING: CPT

## 2024-11-13 PROCEDURE — 71046 X-RAY EXAM CHEST 2 VIEWS: CPT

## 2024-11-15 LAB
ATRIAL RATE: 69 BPM
P AXIS: 73 DEGREES
PR INTERVAL: 154 MS
QRS AXIS: 68 DEGREES
QRSD INTERVAL: 98 MS
QT INTERVAL: 392 MS
QTC INTERVAL: 420 MS
T WAVE AXIS: 60 DEGREES
VENTRICULAR RATE: 69 BPM

## 2024-11-15 PROCEDURE — 93010 ELECTROCARDIOGRAM REPORT: CPT | Performed by: INTERNAL MEDICINE

## 2024-11-20 ENCOUNTER — ANESTHESIA EVENT (OUTPATIENT)
Dept: PERIOP | Facility: HOSPITAL | Age: 54
End: 2024-11-20
Payer: COMMERCIAL

## 2024-11-26 ENCOUNTER — APPOINTMENT (OUTPATIENT)
Dept: PREADMISSION TESTING | Facility: HOSPITAL | Age: 54
End: 2024-11-26
Payer: COMMERCIAL

## 2024-11-26 NOTE — PRE-PROCEDURE INSTRUCTIONS
Pre-Surgery Instructions:   Medication Instructions    Cholecalciferol (VITAMIN D3) 400 units CAPS Hold day of surgery.      Medication instructions for day surgery reviewed. Please use only a sip of water to take your instructed medications. Avoid all over the counter vitamins, supplements and NSAIDS for one week prior to surgery per anesthesia guidelines. Tylenol is ok to take as needed.     You will receive a call one business day prior to surgery with an arrival time and hospital directions. If your surgery is scheduled on a Monday, the hospital will be calling you on the Friday prior to your surgery. If you have not heard from anyone by 8pm, please call the hospital supervisor through the hospital  at [phone].    Do not eat or drink anything after midnight the night before your surgery, including candy, mints, lifesavers, or chewing gum. Do not drink alcohol 24hrs before your surgery. Try not to smoke at least 24hrs before your surgery.       Follow the pre surgery showering instructions as listed in the “My Surgical Experience Booklet” or otherwise provided by your surgeon's office. Do not use a blade to shave the surgical area 1 week before surgery. It is okay to use a clean electric clippers up to 24 hours before surgery. Do not apply any lotions, creams, including makeup, cologne, deodorant, or perfumes after showering on the day of your surgery. Do not use dry shampoo, hair spray, hair gel, or any type of hair products.     No contact lenses, eye make-up, or artificial eyelashes. Remove nail polish, including gel polish, and any artificial, gel, or acrylic nails if possible. Remove all jewelry including rings and body piercing jewelry.     Wear causal clothing that is easy to take on and off. Consider your type of surgery.    Keep any valuables, jewelry, piercings at home. Please bring any specially ordered equipment (sling, braces) if indicated.    Arrange for a responsible person to drive  you to and from the hospital on the day of your surgery. Please confirm the visitor policy for the day of your procedure when you receive your phone call with an arrival time.     Call the surgeon's office with any new illnesses, exposures, or additional questions prior to surgery.    Please reference your “My Surgical Experience Booklet” for additional information to prepare for your upcoming surgery.

## 2024-12-03 ENCOUNTER — ANESTHESIA (OUTPATIENT)
Dept: PERIOP | Facility: HOSPITAL | Age: 54
End: 2024-12-03
Payer: COMMERCIAL

## 2024-12-03 ENCOUNTER — HOSPITAL ENCOUNTER (OUTPATIENT)
Facility: HOSPITAL | Age: 54
Setting detail: OUTPATIENT SURGERY
Discharge: HOME | End: 2024-12-03
Attending: SURGERY | Admitting: SURGERY
Payer: COMMERCIAL

## 2024-12-03 VITALS
OXYGEN SATURATION: 97 % | HEIGHT: 69 IN | BODY MASS INDEX: 30.81 KG/M2 | DIASTOLIC BLOOD PRESSURE: 68 MMHG | HEART RATE: 70 BPM | RESPIRATION RATE: 18 BRPM | TEMPERATURE: 97.7 F | WEIGHT: 208 LBS | SYSTOLIC BLOOD PRESSURE: 128 MMHG

## 2024-12-03 DIAGNOSIS — K42.9 UMBILICAL HERNIA WITHOUT OBSTRUCTION AND WITHOUT GANGRENE: Primary | ICD-10-CM

## 2024-12-03 PROCEDURE — 49591 RPR AA HRN 1ST < 3 CM RDC: CPT | Performed by: SURGERY

## 2024-12-03 PROCEDURE — 49591 RPR AA HRN 1ST < 3 CM RDC: CPT | Performed by: PHYSICIAN ASSISTANT

## 2024-12-03 RX ORDER — FENTANYL CITRATE/PF 50 MCG/ML
25 SYRINGE (ML) INJECTION
Status: DISCONTINUED | OUTPATIENT
Start: 2024-12-03 | End: 2024-12-03 | Stop reason: HOSPADM

## 2024-12-03 RX ORDER — SODIUM CHLORIDE, SODIUM LACTATE, POTASSIUM CHLORIDE, CALCIUM CHLORIDE 600; 310; 30; 20 MG/100ML; MG/100ML; MG/100ML; MG/100ML
INJECTION, SOLUTION INTRAVENOUS CONTINUOUS PRN
Status: DISCONTINUED | OUTPATIENT
Start: 2024-12-03 | End: 2024-12-03

## 2024-12-03 RX ORDER — HYDROMORPHONE HCL/PF 1 MG/ML
0.5 SYRINGE (ML) INJECTION
Status: COMPLETED | OUTPATIENT
Start: 2024-12-03 | End: 2024-12-03

## 2024-12-03 RX ORDER — ONDANSETRON 2 MG/ML
INJECTION INTRAMUSCULAR; INTRAVENOUS AS NEEDED
Status: DISCONTINUED | OUTPATIENT
Start: 2024-12-03 | End: 2024-12-03

## 2024-12-03 RX ORDER — CEFAZOLIN SODIUM 2 G/50ML
2000 SOLUTION INTRAVENOUS ONCE
Status: COMPLETED | OUTPATIENT
Start: 2024-12-03 | End: 2024-12-03

## 2024-12-03 RX ORDER — KETOROLAC TROMETHAMINE 30 MG/ML
INJECTION, SOLUTION INTRAMUSCULAR; INTRAVENOUS AS NEEDED
Status: DISCONTINUED | OUTPATIENT
Start: 2024-12-03 | End: 2024-12-03

## 2024-12-03 RX ORDER — ROCURONIUM BROMIDE 10 MG/ML
INJECTION, SOLUTION INTRAVENOUS AS NEEDED
Status: DISCONTINUED | OUTPATIENT
Start: 2024-12-03 | End: 2024-12-03

## 2024-12-03 RX ORDER — FENTANYL CITRATE 50 UG/ML
INJECTION, SOLUTION INTRAMUSCULAR; INTRAVENOUS AS NEEDED
Status: DISCONTINUED | OUTPATIENT
Start: 2024-12-03 | End: 2024-12-03

## 2024-12-03 RX ORDER — HYDROCODONE BITARTRATE AND ACETAMINOPHEN 5; 325 MG/1; MG/1
1 TABLET ORAL EVERY 6 HOURS PRN
Qty: 15 TABLET | Refills: 0 | Status: ON HOLD | OUTPATIENT
Start: 2024-12-03 | End: 2024-12-06

## 2024-12-03 RX ORDER — ONDANSETRON 2 MG/ML
4 INJECTION INTRAMUSCULAR; INTRAVENOUS ONCE AS NEEDED
Status: DISCONTINUED | OUTPATIENT
Start: 2024-12-03 | End: 2024-12-03 | Stop reason: HOSPADM

## 2024-12-03 RX ORDER — PROPOFOL 10 MG/ML
INJECTION, EMULSION INTRAVENOUS AS NEEDED
Status: DISCONTINUED | OUTPATIENT
Start: 2024-12-03 | End: 2024-12-03

## 2024-12-03 RX ORDER — SODIUM CHLORIDE, SODIUM LACTATE, POTASSIUM CHLORIDE, CALCIUM CHLORIDE 600; 310; 30; 20 MG/100ML; MG/100ML; MG/100ML; MG/100ML
125 INJECTION, SOLUTION INTRAVENOUS CONTINUOUS
Status: DISCONTINUED | OUTPATIENT
Start: 2024-12-03 | End: 2024-12-03 | Stop reason: HOSPADM

## 2024-12-03 RX ORDER — MIDAZOLAM HYDROCHLORIDE 2 MG/2ML
INJECTION, SOLUTION INTRAMUSCULAR; INTRAVENOUS AS NEEDED
Status: DISCONTINUED | OUTPATIENT
Start: 2024-12-03 | End: 2024-12-03

## 2024-12-03 RX ORDER — HYDROCODONE BITARTRATE AND ACETAMINOPHEN 5; 325 MG/1; MG/1
1 TABLET ORAL EVERY 6 HOURS PRN
Refills: 0 | Status: COMPLETED | OUTPATIENT
Start: 2024-12-03 | End: 2024-12-03

## 2024-12-03 RX ORDER — DEXAMETHASONE SODIUM PHOSPHATE 10 MG/ML
INJECTION, SOLUTION INTRAMUSCULAR; INTRAVENOUS AS NEEDED
Status: DISCONTINUED | OUTPATIENT
Start: 2024-12-03 | End: 2024-12-03

## 2024-12-03 RX ADMIN — SUGAMMADEX 100 MG: 100 INJECTION, SOLUTION INTRAVENOUS at 13:01

## 2024-12-03 RX ADMIN — HYDROMORPHONE HYDROCHLORIDE 0.5 MG: 1 INJECTION, SOLUTION INTRAMUSCULAR; INTRAVENOUS; SUBCUTANEOUS at 13:20

## 2024-12-03 RX ADMIN — FENTANYL CITRATE 100 MCG: 50 INJECTION INTRAMUSCULAR; INTRAVENOUS at 12:28

## 2024-12-03 RX ADMIN — ONDANSETRON 4 MG: 2 INJECTION, SOLUTION INTRAMUSCULAR; INTRAVENOUS at 12:40

## 2024-12-03 RX ADMIN — KETOROLAC TROMETHAMINE 15 MG: 30 INJECTION, SOLUTION INTRAMUSCULAR; INTRAVENOUS at 13:10

## 2024-12-03 RX ADMIN — PROPOFOL 200 MG: 10 INJECTION, EMULSION INTRAVENOUS at 12:28

## 2024-12-03 RX ADMIN — MIDAZOLAM 2 MG: 1 INJECTION INTRAMUSCULAR; INTRAVENOUS at 12:20

## 2024-12-03 RX ADMIN — CEFAZOLIN SODIUM 2000 MG: 2 SOLUTION INTRAVENOUS at 12:36

## 2024-12-03 RX ADMIN — HYDROMORPHONE HYDROCHLORIDE 0.5 MG: 1 INJECTION, SOLUTION INTRAMUSCULAR; INTRAVENOUS; SUBCUTANEOUS at 13:26

## 2024-12-03 RX ADMIN — DEXAMETHASONE SODIUM PHOSPHATE 10 MG: 10 INJECTION, SOLUTION INTRAMUSCULAR; INTRAVENOUS at 12:28

## 2024-12-03 RX ADMIN — ROCURONIUM BROMIDE 50 MG: 10 INJECTION, SOLUTION INTRAVENOUS at 12:28

## 2024-12-03 RX ADMIN — SODIUM CHLORIDE, SODIUM LACTATE, POTASSIUM CHLORIDE, AND CALCIUM CHLORIDE: .6; .31; .03; .02 INJECTION, SOLUTION INTRAVENOUS at 12:24

## 2024-12-03 RX ADMIN — HYDROCODONE BITARTRATE AND ACETAMINOPHEN 1 TABLET: 5; 325 TABLET ORAL at 13:44

## 2024-12-03 NOTE — OP NOTE
REPAIR HERNIA UMBILICAL  Postoperative Note  PATIENT NAME: Obi Marie  : 1970  MRN: [id number]  [facility] OR ROOM 02    Surgery Date: 12/3/2024    Umbilical hernia without obstruction or gangrene [K42.9]    Post-Op Diagnosis Codes:     * Umbilical hernia without obstruction or gangrene [K42.9]    Procedure(s):  REPAIR HERNIA UMBILICAL    Surgeons and Role:     * Marcio Treviño MD - Primary     * Caitlin Jones PA-C - Assisting    The Physician Assistant was medically necessary for surgical safety the case including suturing, retraction, and hemostasis.    Specimens:  * No specimens in log *    Estimated Blood Loss:   Minimal    Anesthesia Type:   General     Procedure:  The patient was seen in the Holding Room. The risks, benefits, complications, treatment options, and expected outcomes were discussed with the patient. The possibilities of reaction to medication, pulmonary aspiration, perforation of viscus, bleeding, recurrent infection, the need for additional procedures, failure to diagnose a condition, and creating a complication requiring transfusion or operation were discussed with the patient. The patient concurred with the proposed plan, giving informed consent.  The site of surgery properly noted/marked. The patient was taken to Operating Room. A Time Out was held and the above information confirmed.    The patient was prepped and draped in a sterile fashion.  An additional timeout was performed.  An midline incision was made, dissection carried out to the hernia sac.  Hernia sac was freed of its surrounding adhesions.  The hernia sac was opened and its contents reduced. Excess hernia sac was excised. The fascia was closed interrupted figure 2-0 Prolene.    The subcutaneous tissues were closed using 3-0 Vicryl sutures and the skin closed using a 4-0 Monocryl subcuticular stitch. The wound was dressed, placing positive pressure in the umbilicus with a gauze pack.  The wound was dressed with  Histacryl.  The patient tolerated the procedure well.    Instrument, sponge, and needle counts were correct prior to closure and at the conclusion of the case.     This text is generated with voice recognition software. There may be translation, syntax,  or grammatical errors. If you have any questions, please contact the dictating provider.     Complications: None    Condition: Stable to PACU    SIGNATURE: Marcio Treviño MD   DATE: December 3, 2024   TIME: 2:39 PM

## 2024-12-03 NOTE — ANESTHESIA POSTPROCEDURE EVALUATION
Post-Op Assessment Note    CV Status:  Stable  Pain Score: 0    Pain management: adequate       Mental Status:  Alert and awake   Hydration Status:  Stable   PONV Controlled:  None   Airway Patency:  Patent     Post Op Vitals Reviewed: Yes    No anethesia notable event occurred.    Staff: CRNA           Last Filed PACU Vitals:  Vitals Value Taken Time   Temp 97.7 °F (36.5 °C) 12/03/24 1311   Pulse 76 12/03/24 1311   /89 12/03/24 1312   Resp 16 12/03/24 1311   SpO2 98 % 12/03/24 1311   Vitals shown include unfiled device data.    Modified Lloyd:  No data recorded

## 2024-12-03 NOTE — ANESTHESIA PREPROCEDURE EVALUATION
"Procedure:  REPAIR HERNIA UMBILICAL (Abdomen)    Relevant Problems   ANESTHESIA (within normal limits)      CARDIO   (+) Hyperlipidemia   (+) Mondor's disease      PULMONARY   (+) Current smoker      Rheumatology   (+) Lumbar disc herniation      Orthopedic/Musculoskeletal   (+) Lumbar radiculopathy     Lab Results   Component Value Date    WBC 8.2 09/21/2024    HGB 16.0 09/21/2024     09/21/2024     Lab Results   Component Value Date    SODIUM 139 09/21/2024    K 4.4 09/21/2024    BUN 21 09/21/2024    CREATININE 0.94 09/21/2024    EGFR 98 09/21/2024    GLUCOSE 99 02/27/2017     No results found for: \"PTT\"   No results found for: \"INR\"    Lab Results   Component Value Date    HGBA1C 5.7 08/27/2022       Type and Screen:  No results found for: \"ABO\", \"RH\", \"ABS\", \"SPECIMEXP\"            Physical Exam    Airway    Mallampati score: I  TM Distance: >3 FB  Neck ROM: full     Dental   No notable dental hx     Cardiovascular  Cardiovascular exam normal    Pulmonary  Pulmonary exam normal     Other Findings        Anesthesia Plan  ASA Score- 2     Anesthesia Type- general with ASA Monitors.         Additional Monitors:     Airway Plan: ETT and LMA.    Comment: I discussed risks (reviewed with patient on the anesthesia consent form), benefits and alternatives for General Anesthesia.  These risks did include breathing tube remaining in place if not strong enough, PONV, damage to lips and teeth, sore throat, eye injury or blindness, risk of heart attack or stroke that may lead to death.  .       Plan Factors-    Chart reviewed. EKG reviewed.  Existing labs reviewed. Patient summary reviewed.                  Induction- intravenous.    Postoperative Plan- Plan for postoperative opioid use.         Informed Consent- Anesthetic plan and risks discussed with patient.  I personally reviewed this patient with the CRNA. Discussed and agreed on the Anesthesia Plan with the CRNA..        "

## 2024-12-03 NOTE — INTERIM OP NOTE
REPAIR HERNIA UMBILICAL  Postoperative Note  PATIENT NAME: Obi Marie  : 1970  MRN: [id number]  [facility] OR ROOM 02    Surgery Date: 12/3/2024    Preop Diagnosis:  Umbilical hernia without obstruction or gangrene [K42.9]    Post-Op Diagnosis Codes:     * Umbilical hernia without obstruction or gangrene [K42.9]    Procedure(s) (LRB):  REPAIR HERNIA UMBILICAL (N/A)    Surgeons and Role:     * Marcio Treviño MD - Primary    Specimens:  * No specimens in log *    Estimated Blood Loss:   Minimal    Anesthesia Type:   General     Findings:   Small umbilical hernia    Complications:   None    Hernia Surgery Operative Note    Name: Obi Marie    Gender: Male    Age: 54 y.o.    Race:     BMI: 30.72    DIAGNOSIS:umbilical hernia    Diabetes Mellitis: No    Coronary Heart Disease: No    Cancer: No    Steroid Use: No    Tobacco use: Yes   Last used: / ppd   Type: cigarettes    Frequency (per day):  unknown   Duration (years): 17    Alcohol use: Yes Minimal     Location of Hernia: umbilical   Length:2  Width:2.5  Primary: Yes  Recurrent: No   Number of recurrences:    Access: Open    Component Separation: No    Mesh:   No    Operative Time: 22mins             SIGNATURE: Caitlin Jones PA-C   DATE: December 3, 2024   TIME: 10:47 AM

## 2024-12-03 NOTE — H&P (VIEW-ONLY)
Assessment/Plan:   Obi Marie is a 54 y.o.male who is here for Pre-op Exam (UPDATE H/P FOR [facility] HERNIA REPAIR 12/3/24//PT was last seen 9/16/2024 and stated he has had no new changes or issues since then. )  .    Plan: Umbilical hernia - discussed operative vs conservative mgt, surgical approaches, risks and benefits and patient has decided to proceed with open umbilical herniahernia repair. I will schedule at their earliest convenience.          HPI:  Obi Marie is a 54 y.o.male who was referred for evaluation of Pre-op Exam (UPDATE H/P FOR [facility] HERNIA REPAIR 12/3/24//PT was last seen 9/16/2024 and stated he has had no new changes or issues since then. )  .    Currently umb lump.     ROS:  General ROS: negative  negative for - chills, fatigue, fever or night sweats, weight loss  Respiratory ROS: no cough, shortness of breath, or wheezing  Cardiovascular ROS: no chest pain or dyspnea on exertion  Genito-Urinary ROS: no dysuria, trouble voiding, or hematuria  Musculoskeletal ROS: negative for - gait disturbance, joint pain or muscle pain  Neurological ROS: no TIA or stroke symptoms  Abd pain and lump    [unfilled]  Oxycodone    Current Outpatient Medications:     atorvastatin (LIPITOR) 20 mg tablet, TAKE 1 TABLET BY MOUTH EVERY DAY (Patient not taking: Reported on 11/26/2024), Disp: 30 tablet, Rfl: 5    Cholecalciferol (VITAMIN D3) 400 units CAPS, Take 1 capsule by mouth daily, Disp: , Rfl:     meloxicam (Mobic) 7.5 mg tablet, Take 1 tablet (7.5 mg total) by mouth daily (Patient not taking: Reported on 9/6/2024), Disp: 30 tablet, Rfl: 2  Past Medical History:   Diagnosis Date    Hiatal hernia     Hyperlipidemia     Onychomycosis of toenail      Past Surgical History:   Procedure Laterality Date    BACK SURGERY      spinal fusion    KNEE SURGERY       Family History   Problem Relation Age of Onset    Thyroid cancer Mother     Lung cancer Mother     Coronary artery disease Father     Hyperlipidemia Sister   "   Hyperlipidemia Brother     Substance Abuse Neg Hx     Mental illness Neg Hx       reports that he has been smoking cigarettes. He has a 15 pack-year smoking history. He has never used smokeless tobacco. He reports current alcohol use. He reports current drug use. Drug: Marijuana.    Labs:   Lab Results   Component Value Date    WBC 8.2 09/21/2024    WBC 10.2 04/22/2021    WBC 5.8 04/28/2018    WBC 6.5 02/27/2017    HGB 16.0 09/21/2024    HGB 16.6 04/22/2021    HGB 16.3 04/28/2018    HGB 15.2 02/27/2017     09/21/2024     04/22/2021     04/28/2018     02/27/2017     Lab Results   Component Value Date    ALT 17 09/21/2024    ALT 19 08/03/2022    ALT 21 04/22/2021    AST 16 09/21/2024    AST 19 08/03/2022    AST 23 04/22/2021     This SmartLink has not been configured with any valid records.       PHYSICAL EXAM  General Appearance:    Alert, cooperative, no distress,    Head:    Normocephalic without obvious abnormality   Eyes:    PERRL, conjunctiva/corneas clear, EOM's intact        Neck:   Supple, no adenopathy, no JVD   Back:     Symmetric, no spinal or CVA tenderness   Lungs:     Clear to auscultation bilaterally, no wheezing or rhonchi   Heart:    Regular rate and rhythm, S1 and S2 normal, no murmur   Abdomen:     Soft +BS ND NT + umb hernia   Extremities:   Extremities normal. No clubbing, cyanosis or edema   Psych:   Normal Affect, AOx3.    Neurologic:  Skin:   CNII-XII intact. Strength symmetric, speech intact    Warm, dry, intact, no visible rashes or lesions         Physical Exam              Some portions of this record may have been generated with voice recognition software. There may be translation, syntax,  or grammatical errors. Occasional wrong word or \"sound-a-like\" substitutions may have occurred due to the inherent limitations of the voice recognition software. Read the chart carefully and recognize, using context, where substitutions may have occurred. If you have any " questions, please contact the dictating provider for clarification or correction, as needed. This encounter has been coded by a non-certified coder.

## 2024-12-03 NOTE — INTERVAL H&P NOTE
H&P reviewed. After examining the patient I find no changes in the patients condition since the H&P had been written.    Vitals:    12/03/24 1058   BP: 123/86   Pulse: 77   Resp: 18   Temp: 97.9 °F (36.6 °C)   SpO2: 98%

## 2024-12-03 NOTE — DISCHARGE INSTR - AVS FIRST PAGE
[facility] General Surgery [place]  Post-Operative Care Instructions  Dr. Marcio Treviño MD, [facility]  [phone]    1. General: You will feel pulling sensations around the wound or funny aches and pains around the incisions. This is normal. Even minor surgery is a change in your body and this is your body's way of reacting to it. If you have had abdominal surgery, it may help to support the incision with a small pillow or blanket for comfort when moving or coughing.    2. Wound care:  Okay to shower.  The glue will fall off over the next week or 2.   Use ice for the first 5 days after surgery.  Do not use for longer than 20 minutes at a time. Use ice 5 times per day.    3. Water: You may shower over the wounds. Do not bathe or use a pool or hot tub until cleared by the physician.   If you were discharged with a drain, make sure drain site is covered with plastic wrap before showering.    4. Activity: You may go up and down stairs, walk as much as you are comfortable, but walk at least 3 times each day. If you have had abdominal surgery, do not lift anything heavier than 15 lbs for the 1st 2 weeks and 25 lbs for weeks 3 and 4.     5. Diet: You may resume a regular diet. If you had a same-day surgery or overnight stay surgery, you may wish to eat lightly for a few days: soups, crackers, and sandwiches. You may resume a regular diet when ready.    6. Medications: Resume all of your previous medications, unless told otherwise by the doctor. Avoid aspirin products for 2-3 days after the date of surgery. You may, at that time, begin to take them again. Use Tylenol and Ibuprofen for pain control.  You may alternate these medications every 3 hours.  For example: you may take Tylenol at noon, Ibuprofen at 3:00 p.m., and Tylenol again at 6:00 p.m., etc.  You should use ice to assist with pain control as above.  You do not need to take narcotic pain medication unless you are having significant pain.   If you were  prescribed a narcotic pain medication containing Tylenol, such as Percocet or Norco, do not use supplemental Tylenol.    7. Driving: You will need someone to drive you home on the day of surgery or discharge. Do not drive or make any important decisions while on narcotic pain medication or 24 hours and after anesthesia or sedation for surgery. Generally, you may drive when your off all narcotic pain medications and you are comfortable.     8. Upset Stomach: You may take Maalox, Tums, or similar items for an upset stomach. If your narcotic pain medication causes an upset stomach, do not take it on an empty stomach. Try taking it with at least some crackers or toast.     9. Constipation: Patients often experience constipation after surgery. You may take over-the-counter medication for this, such as Metamucil, Senokot, Dulcolax, milk of magnesia, etc. You may take a suppository unless you have had anorectal surgery such as a procedure on your hemorrhoids. If you experience significant nausea or vomiting after abdominal surgery, call the office before trying any of these medications.    10. Call the office: If you are experiencing any of the following: fevers above 101.5°, significant nausea or vomiting, if the wound develops drainage and/or there is excessive redness around the wound, or if you have significant diarrhea or other worsening symptoms.    11. Pain: You may be given a prescription for pain medication.  This will be sent to your pharmacy prior to discharge.    12. Sexual Activity: You may resume sexual activity when you feel ready and comfortable and your incision is sealed and healed without apparent infection risk.    13. Urination: If you have not urinated in 6 hours, go directly to the ER for evaluation for urinary retention.     14. Follow-up in 2 weeks.      ***READ ONLY IF YOU HAVE BEEN DISCHARGED WITH A URINARY CATHETER***  Vargas Insertion for Post-Op Urinary Retention    - A prescription for Flomax  will be sent to your pharmacy.  This should be taken daily while the urinary catheter remains in place.    You will not be given a prescription for Flomax if your prostate has been removed.  If you are already taking Flomax, continue the medication as prescribed.    - We will send a message to the urology group who will contact you within the next 48 hours with further instructions and to schedule an appointment for voiding trial and catheter removal.  The urinary catheter will remain in place for approximately 1 week.  If you are not contacted within the next 48 hours please call our office to assist with scheduling your follow-up.    - If you have your own urologist, you should contact your physician the day after discharge for instructions and to schedule a voiding trial and catheter removal.

## 2024-12-04 NOTE — ANESTHESIA POSTPROCEDURE EVALUATION
Post-Op Assessment Note    CV Status:  Stable    Pain management: adequate       Mental Status:  Alert and awake   Hydration Status:  Euvolemic   PONV Controlled:  Controlled   Airway Patency:  Patent     Post Op Vitals Reviewed: Yes    No anethesia notable event occurred.    Staff: Anesthesiologist           Last Filed PACU Vitals:  Vitals Value Taken Time   Temp 97.7 °F (36.5 °C) 12/03/24 1311   Pulse 74 12/03/24 1409   /68 12/03/24 1400   Resp 13 12/03/24 1408   SpO2 96 % 12/03/24 1409   Vitals shown include unfiled device data.    Modified Lloyd:  Activity: 2 (12/3/2024  1:40 PM)  Respiration: 2 (12/3/2024  1:40 PM)  Circulation: 2 (12/3/2024  1:40 PM)  Consciousness: 2 (12/3/2024  1:40 PM)  Oxygen Saturation: 2 (12/3/2024  1:40 PM)  Modified Lloyd Score: 10 (12/3/2024  1:40 PM)

## 2024-12-05 ENCOUNTER — NURSE TRIAGE (OUTPATIENT)
Age: 54
End: 2024-12-05

## 2024-12-05 ENCOUNTER — APPOINTMENT (EMERGENCY)
Dept: RADIOLOGY | Facility: HOSPITAL | Age: 54
End: 2024-12-05
Payer: COMMERCIAL

## 2024-12-05 ENCOUNTER — HOSPITAL ENCOUNTER (OUTPATIENT)
Facility: HOSPITAL | Age: 54
Setting detail: OBSERVATION
Discharge: HOME | End: 2024-12-06
Attending: EMERGENCY MEDICINE | Admitting: SURGERY
Payer: COMMERCIAL

## 2024-12-05 ENCOUNTER — APPOINTMENT (EMERGENCY)
Dept: CT IMAGING | Facility: HOSPITAL | Age: 54
End: 2024-12-05
Payer: COMMERCIAL

## 2024-12-05 DIAGNOSIS — R10.9 ABDOMINAL PAIN: Primary | ICD-10-CM

## 2024-12-05 DIAGNOSIS — R59.9 ENLARGED LYMPH NODE: ICD-10-CM

## 2024-12-05 DIAGNOSIS — K42.9 UMBILICAL HERNIA WITHOUT OBSTRUCTION AND WITHOUT GANGRENE: ICD-10-CM

## 2024-12-05 DIAGNOSIS — F17.200 CURRENT SMOKER: ICD-10-CM

## 2024-12-05 DIAGNOSIS — T81.9XXA POSTOPERATIVE COMPLICATION: ICD-10-CM

## 2024-12-05 DIAGNOSIS — R07.9 CHEST PAIN: ICD-10-CM

## 2024-12-05 PROBLEM — Z72.0 TOBACCO ABUSE: Chronic | Status: ACTIVE | Noted: 2024-12-05

## 2024-12-05 PROBLEM — E83.42 HYPOMAGNESEMIA: Status: ACTIVE | Noted: 2024-12-05

## 2024-12-05 PROBLEM — R65.10 SIRS (SYSTEMIC INFLAMMATORY RESPONSE SYNDROME) (HCC): Status: ACTIVE | Noted: 2024-12-05

## 2024-12-05 LAB
2HR DELTA HS TROPONIN: 0 NG/L
4HR DELTA HS TROPONIN: 0 NG/L
ABO GROUP BLD: NORMAL
ABO GROUP BLD: NORMAL
ALBUMIN SERPL BCG-MCNC: 4.2 G/DL (ref 3.5–5)
ALP SERPL-CCNC: 63 U/L (ref 34–104)
ALT SERPL W P-5'-P-CCNC: 22 U/L (ref 7–52)
ANION GAP SERPL CALCULATED.3IONS-SCNC: 10 MMOL/L (ref 4–13)
APTT PPP: 26 SECONDS (ref 23–34)
AST SERPL W P-5'-P-CCNC: 16 U/L (ref 13–39)
BACTERIA UR QL AUTO: ABNORMAL /HPF
BASOPHILS # BLD AUTO: 0.04 THOUSANDS/ÂΜL (ref 0–0.1)
BASOPHILS NFR BLD AUTO: 0 % (ref 0–1)
BILIRUB SERPL-MCNC: 0.48 MG/DL (ref 0.2–1)
BILIRUB UR QL STRIP: NEGATIVE
BLD GP AB SCN SERPL QL: NEGATIVE
BNP SERPL-MCNC: 52 PG/ML (ref 0–100)
BUN SERPL-MCNC: 19 MG/DL (ref 5–25)
CALCIUM SERPL-MCNC: 9.1 MG/DL (ref 8.4–10.2)
CARDIAC TROPONIN I PNL SERPL HS: 3 NG/L (ref ?–50)
CHLORIDE SERPL-SCNC: 103 MMOL/L (ref 96–108)
CLARITY UR: CLEAR
CO2 SERPL-SCNC: 23 MMOL/L (ref 21–32)
COLOR UR: YELLOW
CREAT SERPL-MCNC: 0.93 MG/DL (ref 0.6–1.3)
D DIMER PPP FEU-MCNC: 0.89 UG/ML FEU
EOSINOPHIL # BLD AUTO: 0.1 THOUSAND/ÂΜL (ref 0–0.61)
EOSINOPHIL NFR BLD AUTO: 1 % (ref 0–6)
ERYTHROCYTE [DISTWIDTH] IN BLOOD BY AUTOMATED COUNT: 13.2 % (ref 11.6–15.1)
GFR SERPL CREATININE-BSD FRML MDRD: 94 ML/MIN/1.73SQ M
GLUCOSE SERPL-MCNC: 109 MG/DL (ref 65–140)
GLUCOSE UR STRIP-MCNC: NEGATIVE MG/DL
HCT VFR BLD AUTO: 36.1 % (ref 36.5–49.3)
HCT VFR BLD AUTO: 42.5 % (ref 36.5–49.3)
HGB BLD-MCNC: 12.2 G/DL (ref 12–17)
HGB BLD-MCNC: 14.3 G/DL (ref 12–17)
HGB UR QL STRIP.AUTO: NEGATIVE
IMM GRANULOCYTES # BLD AUTO: 0.11 THOUSAND/UL (ref 0–0.2)
IMM GRANULOCYTES NFR BLD AUTO: 1 % (ref 0–2)
INR PPP: 1.04 (ref 0.85–1.19)
KETONES UR STRIP-MCNC: NEGATIVE MG/DL
LACTATE SERPL-SCNC: 1.2 MMOL/L (ref 0.5–2)
LEUKOCYTE ESTERASE UR QL STRIP: NEGATIVE
LIPASE SERPL-CCNC: 31 U/L (ref 11–82)
LYMPHOCYTES # BLD AUTO: 2.95 THOUSANDS/ÂΜL (ref 0.6–4.47)
LYMPHOCYTES NFR BLD AUTO: 19 % (ref 14–44)
MAGNESIUM SERPL-MCNC: 1.6 MG/DL (ref 1.9–2.7)
MCH RBC QN AUTO: 29.9 PG (ref 26.8–34.3)
MCHC RBC AUTO-ENTMCNC: 33.6 G/DL (ref 31.4–37.4)
MCV RBC AUTO: 89 FL (ref 82–98)
MONOCYTES # BLD AUTO: 1.18 THOUSAND/ÂΜL (ref 0.17–1.22)
MONOCYTES NFR BLD AUTO: 7 % (ref 4–12)
MUCOUS THREADS UR QL AUTO: ABNORMAL
NEUTROPHILS # BLD AUTO: 11.52 THOUSANDS/ÂΜL (ref 1.85–7.62)
NEUTS SEG NFR BLD AUTO: 72 % (ref 43–75)
NITRITE UR QL STRIP: NEGATIVE
NON-SQ EPI CELLS URNS QL MICRO: ABNORMAL /HPF
NRBC BLD AUTO-RTO: 0 /100 WBCS
PH UR STRIP.AUTO: 6.5 [PH]
PLATELET # BLD AUTO: 242 THOUSANDS/UL (ref 149–390)
PMV BLD AUTO: 10.4 FL (ref 8.9–12.7)
POTASSIUM SERPL-SCNC: 3.9 MMOL/L (ref 3.5–5.3)
PROCALCITONIN SERPL-MCNC: <0.05 NG/ML
PROT SERPL-MCNC: 6.7 G/DL (ref 6.4–8.4)
PROT UR STRIP-MCNC: ABNORMAL MG/DL
PROTHROMBIN TIME: 14.1 SECONDS (ref 12.3–15)
RBC # BLD AUTO: 4.79 MILLION/UL (ref 3.88–5.62)
RBC #/AREA URNS AUTO: ABNORMAL /HPF
RH BLD: POSITIVE
RH BLD: POSITIVE
SODIUM SERPL-SCNC: 136 MMOL/L (ref 135–147)
SP GR UR STRIP.AUTO: 1.02 (ref 1–1.03)
SPECIMEN EXPIRATION DATE: NORMAL
UROBILINOGEN UR STRIP-ACNC: <2 MG/DL
WBC # BLD AUTO: 15.9 THOUSAND/UL (ref 4.31–10.16)
WBC #/AREA URNS AUTO: ABNORMAL /HPF

## 2024-12-05 PROCEDURE — 36415 COLL VENOUS BLD VENIPUNCTURE: CPT

## 2024-12-05 PROCEDURE — 84484 ASSAY OF TROPONIN QUANT: CPT

## 2024-12-05 PROCEDURE — 81001 URINALYSIS AUTO W/SCOPE: CPT

## 2024-12-05 PROCEDURE — 93005 ELECTROCARDIOGRAM TRACING: CPT

## 2024-12-05 PROCEDURE — 85025 COMPLETE CBC W/AUTO DIFF WBC: CPT

## 2024-12-05 PROCEDURE — 99285 EMERGENCY DEPT VISIT HI MDM: CPT

## 2024-12-05 PROCEDURE — 85018 HEMOGLOBIN: CPT | Performed by: PHYSICIAN ASSISTANT

## 2024-12-05 PROCEDURE — 74177 CT ABD & PELVIS W/CONTRAST: CPT

## 2024-12-05 PROCEDURE — 87040 BLOOD CULTURE FOR BACTERIA: CPT

## 2024-12-05 PROCEDURE — 85379 FIBRIN DEGRADATION QUANT: CPT

## 2024-12-05 PROCEDURE — 85610 PROTHROMBIN TIME: CPT

## 2024-12-05 PROCEDURE — 85730 THROMBOPLASTIN TIME PARTIAL: CPT

## 2024-12-05 PROCEDURE — 86900 BLOOD TYPING SEROLOGIC ABO: CPT | Performed by: PHYSICIAN ASSISTANT

## 2024-12-05 PROCEDURE — 96365 THER/PROPH/DIAG IV INF INIT: CPT

## 2024-12-05 PROCEDURE — 86901 BLOOD TYPING SEROLOGIC RH(D): CPT | Performed by: PHYSICIAN ASSISTANT

## 2024-12-05 PROCEDURE — 84145 PROCALCITONIN (PCT): CPT

## 2024-12-05 PROCEDURE — 86850 RBC ANTIBODY SCREEN: CPT | Performed by: PHYSICIAN ASSISTANT

## 2024-12-05 PROCEDURE — 71275 CT ANGIOGRAPHY CHEST: CPT

## 2024-12-05 PROCEDURE — 83605 ASSAY OF LACTIC ACID: CPT

## 2024-12-05 PROCEDURE — 83735 ASSAY OF MAGNESIUM: CPT

## 2024-12-05 PROCEDURE — 99222 1ST HOSP IP/OBS MODERATE 55: CPT | Performed by: SURGERY

## 2024-12-05 PROCEDURE — 83690 ASSAY OF LIPASE: CPT

## 2024-12-05 PROCEDURE — 85014 HEMATOCRIT: CPT | Performed by: PHYSICIAN ASSISTANT

## 2024-12-05 PROCEDURE — 96375 TX/PRO/DX INJ NEW DRUG ADDON: CPT

## 2024-12-05 PROCEDURE — 83880 ASSAY OF NATRIURETIC PEPTIDE: CPT

## 2024-12-05 PROCEDURE — 71045 X-RAY EXAM CHEST 1 VIEW: CPT

## 2024-12-05 PROCEDURE — 80053 COMPREHEN METABOLIC PANEL: CPT

## 2024-12-05 RX ORDER — ACETAMINOPHEN 325 MG/1
650 TABLET ORAL EVERY 6 HOURS
Status: DISCONTINUED | OUTPATIENT
Start: 2024-12-05 | End: 2024-12-06 | Stop reason: HOSPADM

## 2024-12-05 RX ORDER — HYDROMORPHONE HCL IN WATER/PF 6 MG/30 ML
0.2 PATIENT CONTROLLED ANALGESIA SYRINGE INTRAVENOUS EVERY 4 HOURS PRN
Status: DISCONTINUED | OUTPATIENT
Start: 2024-12-05 | End: 2024-12-05

## 2024-12-05 RX ORDER — SODIUM CHLORIDE 9 MG/ML
125 INJECTION, SOLUTION INTRAVENOUS CONTINUOUS
Status: DISCONTINUED | OUTPATIENT
Start: 2024-12-05 | End: 2024-12-05

## 2024-12-05 RX ORDER — HYDROMORPHONE HCL/PF 1 MG/ML
0.5 SYRINGE (ML) INJECTION EVERY 4 HOURS PRN
Status: DISCONTINUED | OUTPATIENT
Start: 2024-12-05 | End: 2024-12-06

## 2024-12-05 RX ORDER — HYDROMORPHONE HCL/PF 1 MG/ML
1 SYRINGE (ML) INJECTION EVERY 4 HOURS PRN
Status: DISCONTINUED | OUTPATIENT
Start: 2024-12-05 | End: 2024-12-06

## 2024-12-05 RX ORDER — HYDROMORPHONE HCL IN WATER/PF 6 MG/30 ML
0.2 PATIENT CONTROLLED ANALGESIA SYRINGE INTRAVENOUS
Status: DISCONTINUED | OUTPATIENT
Start: 2024-12-05 | End: 2024-12-05

## 2024-12-05 RX ORDER — HYDROMORPHONE HCL/PF 1 MG/ML
0.5 SYRINGE (ML) INJECTION
Status: DISCONTINUED | OUTPATIENT
Start: 2024-12-05 | End: 2024-12-06

## 2024-12-05 RX ORDER — MAGNESIUM SULFATE HEPTAHYDRATE 40 MG/ML
2 INJECTION, SOLUTION INTRAVENOUS ONCE
Status: COMPLETED | OUTPATIENT
Start: 2024-12-05 | End: 2024-12-05

## 2024-12-05 RX ORDER — LIDOCAINE 50 MG/G
2 PATCH TOPICAL DAILY
Status: DISCONTINUED | OUTPATIENT
Start: 2024-12-06 | End: 2024-12-05

## 2024-12-05 RX ORDER — HYDROMORPHONE HCL/PF 1 MG/ML
0.5 SYRINGE (ML) INJECTION ONCE
Status: COMPLETED | OUTPATIENT
Start: 2024-12-05 | End: 2024-12-05

## 2024-12-05 RX ORDER — SODIUM CHLORIDE, SODIUM LACTATE, POTASSIUM CHLORIDE, CALCIUM CHLORIDE 600; 310; 30; 20 MG/100ML; MG/100ML; MG/100ML; MG/100ML
125 INJECTION, SOLUTION INTRAVENOUS CONTINUOUS
Status: DISCONTINUED | OUTPATIENT
Start: 2024-12-05 | End: 2024-12-06

## 2024-12-05 RX ORDER — HYDROMORPHONE HCL/PF 1 MG/ML
0.5 SYRINGE (ML) INJECTION EVERY 4 HOURS PRN
Status: DISCONTINUED | OUTPATIENT
Start: 2024-12-05 | End: 2024-12-05

## 2024-12-05 RX ORDER — ALBUTEROL SULFATE 0.83 MG/ML
2.5 SOLUTION RESPIRATORY (INHALATION) EVERY 6 HOURS PRN
Status: DISCONTINUED | OUTPATIENT
Start: 2024-12-05 | End: 2024-12-06 | Stop reason: HOSPADM

## 2024-12-05 RX ORDER — HYDROMORPHONE HCL/PF 1 MG/ML
0.2 SYRINGE (ML) INJECTION ONCE
Refills: 0 | Status: COMPLETED | OUTPATIENT
Start: 2024-12-05 | End: 2024-12-05

## 2024-12-05 RX ORDER — LIDOCAINE 50 MG/G
2 PATCH TOPICAL DAILY
Status: DISCONTINUED | OUTPATIENT
Start: 2024-12-05 | End: 2024-12-06 | Stop reason: HOSPADM

## 2024-12-05 RX ORDER — METHOCARBAMOL 500 MG/1
500 TABLET, FILM COATED ORAL EVERY 6 HOURS PRN
Status: DISCONTINUED | OUTPATIENT
Start: 2024-12-05 | End: 2024-12-06

## 2024-12-05 RX ORDER — SODIUM CHLORIDE 9 MG/ML
3 INJECTION INTRAVENOUS
Status: DISCONTINUED | OUTPATIENT
Start: 2024-12-05 | End: 2024-12-06 | Stop reason: HOSPADM

## 2024-12-05 RX ADMIN — SODIUM CHLORIDE, SODIUM LACTATE, POTASSIUM CHLORIDE, AND CALCIUM CHLORIDE 125 ML/HR: .6; .31; .03; .02 INJECTION, SOLUTION INTRAVENOUS at 18:05

## 2024-12-05 RX ADMIN — LIDOCAINE 5% 2 PATCH: 700 PATCH TOPICAL at 21:33

## 2024-12-05 RX ADMIN — MAGNESIUM SULFATE HEPTAHYDRATE 2 G: 2 INJECTION, SOLUTION INTRAVENOUS at 15:16

## 2024-12-05 RX ADMIN — HYDROMORPHONE HYDROCHLORIDE 0.2 MG: 0.2 INJECTION, SOLUTION INTRAMUSCULAR; INTRAVENOUS; SUBCUTANEOUS at 21:13

## 2024-12-05 RX ADMIN — HYDROMORPHONE HYDROCHLORIDE 0.5 MG: 1 INJECTION, SOLUTION INTRAMUSCULAR; INTRAVENOUS; SUBCUTANEOUS at 15:11

## 2024-12-05 RX ADMIN — IOHEXOL 100 ML: 350 INJECTION, SOLUTION INTRAVENOUS at 15:37

## 2024-12-05 RX ADMIN — ACETAMINOPHEN 650 MG: 325 TABLET ORAL at 23:28

## 2024-12-05 RX ADMIN — HYDROMORPHONE HYDROCHLORIDE 0.5 MG: 1 INJECTION, SOLUTION INTRAMUSCULAR; INTRAVENOUS; SUBCUTANEOUS at 17:44

## 2024-12-05 RX ADMIN — SODIUM CHLORIDE 1000 ML: 0.9 INJECTION, SOLUTION INTRAVENOUS at 14:46

## 2024-12-05 RX ADMIN — HYDROMORPHONE HYDROCHLORIDE 0.2 MG: 1 INJECTION, SOLUTION INTRAMUSCULAR; INTRAVENOUS; SUBCUTANEOUS at 14:45

## 2024-12-05 NOTE — ED PROVIDER NOTES
Time reflects when diagnosis was documented in both MDM as applicable and the Disposition within this note       Time User Action Codes Description Comment    12/5/2024  4:28 PM Bianka Ray Add [R10.9] Abdominal pain     12/5/2024  4:34 PM Bianka Ray Add [T81.9XXA] Postoperative complication     12/5/2024  4:34 PM Bianka Ray Add [R07.9] Chest pain     12/5/2024  4:43 PM Bianka Ray Add [R59.9] Enlarged lymph node           ED Disposition       ED Disposition   Admit    Condition   Stable    Date/Time   Thu Dec 5, 2024  4:28 PM    Comment   Case was discussed with Dr. Treviño and the patient's admission status was agreed to be Admission Status: observation status to the service of Dr. Treviño .               Assessment & Plan       Medical Decision Making  DDx: abdominal abscess, electro abnormality, PE, constipation, intestinal perforation   Patient had surgery on 12/3 arriving with new onset of abdominal pain, chest pain, shortness of breath.  Wells score is low.  Will order D-dimer, chest pain workup, CT scan pending D-dimer.  DDimer positive. CT pe a/p ordered.   Patient has a noted leukocytosis, the presence of acute pain but also surgery.  Patient's heart rate above 90, will check remainder of septic labs as he has SIRS criteria.  Patient has no anemia, no rodney. Mg replaced.   Heart score of 2.  No acute events on telemetry.  Initial troponin of 3, pain has been ongoing for more than 3 hours.  No further troponins required.  No elevation in BNP, no evidence of vascular congestion on x-ray.  General Surgery Dr. Beard was alerted when ct images were available prior to radiologist read. Dr. Treviño came to bedside to examine patient. Per note, results were discussed with Dr. Treviño. Plan to admit the patient under general surgery service. Patient remains hemodynamically stable throughout evaluation with no episodes of hypotension or tachycardia throughout evaluation.    Incidental finding: Patient found to have enlarged hilar node.  This was discussed with the patient.  Aware that recommendation for follow-up in 6 months.  Patient is aware that without appropriate follow-up diagnosis such as but not limited to cancer can be missed.  Reviewed that he is a smoker.  Aware that he can follow-up with his PCP for this.  Documented in his medical record.    Amount and/or Complexity of Data Reviewed  Labs: ordered.  Radiology: ordered.    Risk  Prescription drug management.  Decision regarding hospitalization.        ED Course as of 12/05/24 1651   Thu Dec 05, 2024   1447 Made surgery aware that the patient is in the emergency room.   1613 Was examined by Dr. Beard, plan for serial abdominal exam.        Medications   sodium chloride (PF) 0.9 % injection 3 mL (has no administration in time range)   magnesium sulfate 2 g/50 mL IVPB (premix) 2 g (2 g Intravenous New Bag 12/5/24 1516)   HYDROmorphone (DILAUDID) injection 0.2 mg (0.2 mg Intravenous Given 12/5/24 1445)   sodium chloride 0.9 % bolus 1,000 mL (1,000 mL Intravenous New Bag 12/5/24 1446)   HYDROmorphone (DILAUDID) injection 0.5 mg (0.5 mg Intravenous Given 12/5/24 1511)   iohexol (OMNIPAQUE) 350 MG/ML injection (MULTI-DOSE) 100 mL (100 mL Intravenous Given 12/5/24 1537)       ED Risk Strat Scores   HEART Risk Score      Flowsheet Row Most Recent Value   Heart Score Risk Calculator    History 0 Filed at: 12/05/2024 1644   ECG 0 Filed at: 12/05/2024 1644   Age 1 Filed at: 12/05/2024 1644   Risk Factors 1 Filed at: 12/05/2024 1644   Troponin 0 Filed at: 12/05/2024 1644   HEART Score 2 Filed at: 12/05/2024 1644                                   Wells' Criteria for PE      Flowsheet Row Most Recent Value   Wells' Criteria for PE    Clinical signs and symptoms of DVT 0 Filed at: 12/05/2024 1430   PE is primary diagnosis or equally likely 0 Filed at: 12/05/2024 1430   HR >100 0 Filed at: 12/05/2024 1430   Immobilization at least 3  days or Surgery in the previous 4 weeks 1.5 Filed at: 12/05/2024 1430   Previous, objectively diagnosed PE or DVT 0 Filed at: 12/05/2024 1430   Hemoptysis 0 Filed at: 12/05/2024 1430   Malignancy with treatment within 6 months or palliative 0 Filed at: 12/05/2024 1430   Wells' Criteria Total 1.5 Filed at: 12/05/2024 1430                        History of Present Illness       Chief Complaint   Patient presents with    Shortness of Breath     Pt had hernia surgery this past Tuesday and started today with severe abd cramping, CP, and SOB. Pt denies any N/V/D, fevers, or chills.        Past Medical History:   Diagnosis Date    Hiatal hernia     Hyperlipidemia     Onychomycosis of toenail       Past Surgical History:   Procedure Laterality Date    BACK SURGERY      spinal fusion    KNEE SURGERY      [facility] RPR AA HERNIA 1ST < 3 CM REDUCIBLE N/A 12/3/2024    Procedure: REPAIR HERNIA UMBILICAL;  Surgeon: Marcio Treviño MD;  Location:  MAIN OR;  Service: General      Family History   Problem Relation Age of Onset    Thyroid cancer Mother     Lung cancer Mother     Coronary artery disease Father     Hyperlipidemia Sister     Hyperlipidemia Brother     Substance Abuse Neg Hx     Mental illness Neg Hx       Social History     Tobacco Use    Smoking status: Every Day     Current packs/day: 1.00     Average packs/day: 1 pack/day for 15.0 years (15.0 ttl pk-yrs)     Types: Cigarettes    Smokeless tobacco: Never   Vaping Use    Vaping status: Never Used   Substance Use Topics    Alcohol use: Yes     Comment: Rare     Drug use: Yes     Types: Marijuana     Comment: few times week      E-Cigarette/Vaping    E-Cigarette Use Never User       E-Cigarette/Vaping Substances    Nicotine No     THC No     CBD No     Flavoring No     Other No     Unknown No       I have reviewed and agree with the history as documented.     Patient is a 54-year-old male arriving for evaluation of new onset abdominal pain.  Patient states that he had  umbilical hernia repair on 12/3 as an outpatient.  Patient states that he returned home and has had minimal use of pain medication prescribed to him, and is augmented with Motrin.  Patient reports that he has had a small bowel movement since surgery but no relieving bowel movement.  Patient denies any blood in stool or urine.  Patient reports that this morning he woke up with generalized abdominal pain that radiates up into his chest and has shortness of breath.  Patient reports no relief from pain medication at home.  Patient denies any urinary symptoms.        Review of Systems   Constitutional: Negative.    HENT: Negative.     Eyes: Negative.    Respiratory:  Positive for shortness of breath. Negative for cough.    Cardiovascular:  Positive for chest pain.   Gastrointestinal:  Positive for abdominal pain and constipation. Negative for diarrhea, nausea and vomiting.   Endocrine: Negative.    Genitourinary: Negative.    Musculoskeletal: Negative.    Skin: Negative.    Allergic/Immunologic: Negative.    Neurological: Negative.    Hematological: Negative.    Psychiatric/Behavioral: Negative.     All other systems reviewed and are negative.          Objective       ED Triage Vitals   Temperature Pulse Blood Pressure Respirations SpO2 Patient Position - Orthostatic VS   12/05/24 1357 12/05/24 1357 12/05/24 1357 12/05/24 1357 12/05/24 1357 12/05/24 1500   98.8 °F (37.1 °C) 86 143/90 22 100 % Sitting      Temp Source Heart Rate Source BP Location FiO2 (%) Pain Score    12/05/24 1357 12/05/24 1357 12/05/24 1500 -- 12/05/24 1357    Temporal Monitor Left arm  10 - Worst Possible Pain      Vitals      Date and Time Temp Pulse SpO2 Resp BP Pain Score FACES Pain Rating User   12/05/24 1511 -- -- -- -- -- 10 - Worst Possible Pain -- EC   12/05/24 1500 -- 92 97 % 22 115/84 10 - Worst Possible Pain -- EC   12/05/24 1445 -- -- -- -- -- 10 - Worst Possible Pain -- AY   12/05/24 1357 98.8 °F (37.1 °C) 86 100 % 22 143/90 10 - Worst  Possible Pain -- RN            Physical Exam  Vitals and nursing note reviewed.   Constitutional:       Appearance: He is well-developed and normal weight.   HENT:      Head: Normocephalic.   Eyes:      Extraocular Movements: Extraocular movements intact.      Pupils: Pupils are equal, round, and reactive to light.   Cardiovascular:      Rate and Rhythm: Normal rate and regular rhythm.   Pulmonary:      Effort: Pulmonary effort is normal.      Breath sounds: Normal breath sounds. No decreased breath sounds.   Abdominal:      General: Bowel sounds are normal.      Palpations: Abdomen is soft.      Tenderness: There is abdominal tenderness.   Musculoskeletal:         General: Normal range of motion.      Cervical back: Normal range of motion and neck supple.      Right lower leg: No edema.      Left lower leg: No edema.   Skin:     General: Skin is warm.      Capillary Refill: Capillary refill takes less than 2 seconds.   Neurological:      General: No focal deficit present.      Mental Status: He is alert and oriented to person, place, and time.   Psychiatric:         Mood and Affect: Mood normal.         Behavior: Behavior normal.         Results Reviewed       Procedure Component Value Units Date/Time    Urine Microscopic [[id number]] Collected: 12/05/24 1636    Lab Status: In process Specimen: Urine, Clean Catch Updated: 12/05/24 1642    Procalcitonin [[id number]]  (Normal) Collected: 12/05/24 1556    Lab Status: Final result Specimen: Blood from Arm, Right Updated: 12/05/24 1641     Procalcitonin <0.05 ng/ml     UA w Reflex to Microscopic w Reflex to Culture [[id number]] Collected: 12/05/24 1636    Lab Status: In process Specimen: Urine, Clean Catch Updated: 12/05/24 1639    HS Troponin I 2hr [[id number]] Collected: 12/05/24 1633    Lab Status: In process Specimen: Blood from Arm, Right Updated: 12/05/24 1639    Lactic acid, plasma (w/reflex if result > 2.0) [[id number]]  (Normal) Collected: 12/05/24 1556    Lab  Status: Final result Specimen: Blood from Arm, Right Updated: 12/05/24 1631     LACTIC ACID 1.2 mmol/L     Narrative:      Result may be elevated if tourniquet was used during collection.    Blood culture #2 [[id number]] Collected: 12/05/24 1623    Lab Status: In process Specimen: Blood from Arm, Right Updated: 12/05/24 1630    Protime-INR [[id number]]  (Normal) Collected: 12/05/24 1556    Lab Status: Final result Specimen: Blood from Arm, Right Updated: 12/05/24 1629     Protime 14.1 seconds      INR 1.04    Narrative:      INR Therapeutic Range    Indication                                             INR Range      Atrial Fibrillation                                               2.0-3.0  Hypercoagulable State                                    2.0.2.3  Left Ventricular Asist Device                            2.0-3.0  Mechanical Heart Valve                                  -    Aortic(with afib, MI, embolism, HF, LA enlargement,    and/or coagulopathy)                                     2.0-3.0 (2.5-3.5)     Mitral                                                             2.5-3.5  Prosthetic/Bioprosthetic Heart Valve               2.0-3.0  Venous thromboembolism (VTE: VT, PE        2.0-3.0    APTT [[id number]]  (Normal) Collected: 12/05/24 1556    Lab Status: Final result Specimen: Blood from Arm, Right Updated: 12/05/24 1629     PTT 26 seconds     Blood culture #1 [[id number]] Collected: 12/05/24 1556    Lab Status: In process Specimen: Blood from Arm, Right Updated: 12/05/24 1608    HS Troponin I 4hr [[id number]]     Lab Status: No result Specimen: Blood     D-dimer, quantitative [[id number]]  (Abnormal) Collected: 12/05/24 1447    Lab Status: Final result Specimen: Blood from Arm, Left Updated: 12/05/24 1518     D-Dimer, Quant 0.89 ug/ml FEU     Narrative:      In the evaluation for possible pulmonary embolism, in the appropriate (Well's Score of 4 or less) patient, the age adjusted d-dimer cutoff for this  patient can be calculated as:    Age x 0.01 (in ug/mL) for Age-adjusted D-dimer exclusion threshold for a patient over 50 years.    HS Troponin 0hr (reflex protocol) [[id number]]  (Normal) Collected: 12/05/24 1447    Lab Status: Final result Specimen: Blood from Arm, Left Updated: 12/05/24 1517     hs TnI 0hr 3 ng/L     B-Type Natriuretic Peptide(BNP) [[id number]]  (Normal) Collected: 12/05/24 1447    Lab Status: Final result Specimen: Blood from Arm, Left Updated: 12/05/24 1516     BNP 52 pg/mL     Comprehensive metabolic panel [[id number]] Collected: 12/05/24 1447    Lab Status: Final result Specimen: Blood from Arm, Left Updated: 12/05/24 1512     Sodium 136 mmol/L      Potassium 3.9 mmol/L      Chloride 103 mmol/L      CO2 23 mmol/L      ANION GAP 10 mmol/L      BUN 19 mg/dL      Creatinine 0.93 mg/dL      Glucose 109 mg/dL      Calcium 9.1 mg/dL      AST 16 U/L      ALT 22 U/L      Alkaline Phosphatase 63 U/L      Total Protein 6.7 g/dL      Albumin 4.2 g/dL      Total Bilirubin 0.48 mg/dL      eGFR 94 ml/min/1.73sq m     Narrative:      [employer] guidelines for Chronic Kidney Disease (CKD):     Stage 1 with normal or high GFR (GFR > 90 mL/min/1.73 square meters)    Stage 2 Mild CKD (GFR = 60-89 mL/min/1.73 square meters)    Stage 3A Moderate CKD (GFR = 45-59 mL/min/1.73 square meters)    Stage 3B Moderate CKD (GFR = 30-44 mL/min/1.73 square meters)    Stage 4 Severe CKD (GFR = 15-29 mL/min/1.73 square meters)    Stage 5 End Stage CKD (GFR <15 mL/min/1.73 square meters)  Note: GFR calculation is accurate only with a steady state creatinine    Lipase [[id number]]  (Normal) Collected: 12/05/24 1447    Lab Status: Final result Specimen: Blood from Arm, Left Updated: 12/05/24 1512     Lipase 31 u/L     Magnesium [[id number]]  (Abnormal) Collected: 12/05/24 1447    Lab Status: Final result Specimen: Blood from Arm, Left Updated: 12/05/24 1512     Magnesium 1.6 mg/dL     CBC and differential  [[id number]]  (Abnormal) Collected: 12/05/24 1447    Lab Status: Final result Specimen: Blood from Arm, Left Updated: 12/05/24 1455     WBC 15.90 Thousand/uL      RBC 4.79 Million/uL      Hemoglobin 14.3 g/dL      Hematocrit 42.5 %      MCV 89 fL      MCH 29.9 pg      MCHC 33.6 g/dL      RDW 13.2 %      MPV 10.4 fL      Platelets 242 Thousands/uL      nRBC 0 /100 WBCs      Segmented % 72 %      Immature Grans % 1 %      Lymphocytes % 19 %      Monocytes % 7 %      Eosinophils Relative 1 %      Basophils Relative 0 %      Absolute Neutrophils 11.52 Thousands/µL      Absolute Immature Grans 0.11 Thousand/uL      Absolute Lymphocytes 2.95 Thousands/µL      Absolute Monocytes 1.18 Thousand/µL      Eosinophils Absolute 0.10 Thousand/µL      Basophils Absolute 0.04 Thousands/µL             CT pe study w abdomen pelvis w contrast   Final Interpretation by Kaylah Villanueva MD (12/05 1629)         1. No central pulmonary embolism.   2. Hemoperitoneum with hematoma in the anterior peritoneal cavity to the left of midline. Small foci of contrast extravasation at the inferior medial aspect of the hematoma.   3. Nonspecific prominence of right hilar nodes, most likely of infectious or inflammatory etiology. Finding could be followed with CT with contrast in 6 months.      I personally discussed this study with Jameson Treviño on 12/5/2024 4:20 PM.   The study was marked in EPIC for immediate notification.                              Workstation performed: [id number]         XR chest 1 view portable   Final Interpretation by Elaine Loaiza MD (12/05 1457)      No acute cardiopulmonary disease.            Workstation performed: [id number]             ECG 12 Lead Documentation Only    Date/Time: 12/5/2024 3:40 PM    Performed by: SLOANE Cardenas  Authorized by: SLOANE Cardenas    Indications / Diagnosis:  Chest pain  ECG reviewed by me, the ED Provider: yes    Patient location:  ED  Interpretation:      Interpretation: normal    Rate:     ECG rate:  77    ECG rate assessment: normal    Rhythm:     Rhythm: sinus rhythm    Ectopy:     Ectopy: none    QRS:     QRS axis:  Normal  Conduction:     Conduction: normal    ST segments:     ST segments:  Normal  T waves:     T waves: normal    ECG 12 Lead Documentation Only    Date/Time: 12/5/2024 4:48 PM    Performed by: SLOANE Cardenas  Authorized by: SLOANE Cardenas    Indications / Diagnosis:  Chest pain  ECG reviewed by me, the ED Provider: yes    Patient location:  ED  Interpretation:     Interpretation: normal    Rate:     ECG rate:  77    ECG rate assessment: normal    Rhythm:     Rhythm: sinus rhythm    Ectopy:     Ectopy: none    QRS:     QRS axis:  Normal  Conduction:     Conduction: normal    ST segments:     ST segments:  Normal  T waves:     T waves: normal        ED Medication and Procedure Management   Prior to Admission Medications   Prescriptions Last Dose Informant Patient Reported? Taking?   Cholecalciferol (VITAMIN D3) 400 units CAPS  Self Yes No   Sig: Take 1 capsule by mouth daily   HYDROcodone-acetaminophen (Norco) 5-325 mg per tablet   No No   Sig: Take 1 tablet by mouth every 6 (six) hours as needed for pain for up to 10 days Max Daily Amount: 4 tablets      Facility-Administered Medications: None     Patient's Medications   Discharge Prescriptions    No medications on file     No discharge procedures on file.  ED SEPSIS DOCUMENTATION   Time reflects when diagnosis was documented in both MDM as applicable and the Disposition within this note       Time User Action Codes Description Comment    12/5/2024  4:28 PM Bianka Ray Add [R10.9] Abdominal pain     12/5/2024  4:34 PM Bianka Ray [T81.9XXA] Postoperative complication     12/5/2024  4:34 PM Bianka Ray Add [R07.9] Chest pain     12/5/2024  4:43 PM Bianka Ray Add [R59.9] Enlarged lymph node                  SLOANE Cardenas  12/05/24 5735

## 2024-12-05 NOTE — PLAN OF CARE
Problem: Potential for Falls  Goal: Patient will remain free of falls  Description: INTERVENTIONS:  - Educate patient/family on patient safety including physical limitations  - Instruct patient to call for assistance with activity   - Consult OT/PT to assist with strengthening/mobility   - Keep Call bell within reach  - Keep bed low and locked with side rails adjusted as appropriate  - Keep care items and personal belongings within reach  - Initiate and maintain comfort rounds  - Make Fall Risk Sign visible to staff  - Offer Toileting every  Hours, in advance of need  - Initiate/Maintain alarm  - Obtain necessary fall risk management equipment:   - Apply yellow socks and bracelet for high fall risk patients  - Consider moving patient to room near nurses station  12/5/2024 1824 by Camille Snow RN  Outcome: Progressing  12/5/2024 1824 by Camille Snow RN  Outcome: Progressing     Problem: PAIN - ADULT  Goal: Verbalizes/displays adequate comfort level or baseline comfort level  Description: Interventions:  - Encourage patient to monitor pain and request assistance  - Assess pain using appropriate pain scale  - Administer analgesics based on type and severity of pain and evaluate response  - Implement non-pharmacological measures as appropriate and evaluate response  - Consider cultural and social influences on pain and pain management  - Notify physician/advanced practitioner if interventions unsuccessful or patient reports new pain  Outcome: Progressing     Problem: INFECTION - ADULT  Goal: Absence or prevention of progression during hospitalization  Description: INTERVENTIONS:  - Assess and monitor for signs and symptoms of infection  - Monitor lab/diagnostic results  - Monitor all insertion sites, i.e. indwelling lines, tubes, and drains  - Monitor endotracheal if appropriate and nasal secretions for changes in amount and color  - [place] appropriate cooling/warming therapies per order  - Administer  medications as ordered  - Instruct and encourage patient and family to use good hand hygiene technique  - Identify and instruct in appropriate isolation precautions for identified infection/condition  Outcome: Progressing  Goal: Absence of fever/infection during neutropenic period  Description: INTERVENTIONS:  - Monitor WBC    Outcome: Progressing     Problem: SAFETY ADULT  Goal: Patient will remain free of falls  Description: INTERVENTIONS:  - Educate patient/family on patient safety including physical limitations  - Instruct patient to call for assistance with activity   - Consult OT/PT to assist with strengthening/mobility   - Keep Call bell within reach  - Keep bed low and locked with side rails adjusted as appropriate  - Keep care items and personal belongings within reach  - Initiate and maintain comfort rounds  - Make Fall Risk Sign visible to staff  - Offer Toileting every  Hours, in advance of need  - Initiate/Maintain alarm  - Obtain necessary fall risk management equipment:   - Apply yellow socks and bracelet for high fall risk patients  - Consider moving patient to room near nurses station  12/5/2024 1824 by Camille Snow RN  Outcome: Progressing  12/5/2024 1824 by Camille Snwo RN  Outcome: Progressing  Goal: Maintain or return to baseline ADL function  Description: INTERVENTIONS:  -  Assess patient's ability to carry out ADLs; assess patient's baseline for ADL function and identify physical deficits which impact ability to perform ADLs (bathing, care of mouth/teeth, toileting, grooming, dressing, etc.)  - Assess/evaluate cause of self-care deficits   - Assess range of motion  - Assess patient's mobility; develop plan if impaired  - Assess patient's need for assistive devices and provide as appropriate  - Encourage maximum independence but intervene and supervise when necessary  - Involve family in performance of ADLs  - Assess for home care needs following discharge   - Consider OT consult to  assist with ADL evaluation and planning for discharge  - Provide patient education as appropriate  Outcome: Progressing  Goal: Maintains/Returns to pre admission functional level  Description: INTERVENTIONS:  - Perform AM-PAC 6 Click Basic Mobility/ Daily Activity assessment daily.  - Set and communicate daily mobility goal to care team and patient/family/caregiver.   - Collaborate with rehabilitation services on mobility goals if consulted  - Perform Range of Motion times a day.  - Reposition patient every  hours.  - Dangle patient  times a day  - Stand patient  times a day  - Ambulate patient  times a day  - Out of bed to chair times a day   - Out of bed for meals times a day  - Out of bed for toileting  - Record patient progress and toleration of activity level   Outcome: Progressing     Problem: DISCHARGE PLANNING  Goal: Discharge to home or other facility with appropriate resources  Description: INTERVENTIONS:  - Identify barriers to discharge w/patient and caregiver  - Arrange for needed discharge resources and transportation as appropriate  - Identify discharge learning needs (meds, wound care, etc.)  - Arrange for interpretive services to assist at discharge as needed  - Refer to Case Management Department for coordinating discharge planning if the patient needs post-hospital services based on physician/advanced practitioner order or complex needs related to functional status, cognitive ability, or social support system  Outcome: Progressing     Problem: Knowledge Deficit  Goal: Patient/family/caregiver demonstrates understanding of disease process, treatment plan, medications, and discharge instructions  Description: Complete learning assessment and assess knowledge base.  Interventions:  - Provide teaching at level of understanding  - Provide teaching via preferred learning methods  Outcome: Progressing

## 2024-12-05 NOTE — TELEPHONE ENCOUNTER
"Patient of Dr.Emanuel Treviño. Had REPAIR HERNIA UMBILICAL (Abdomen) done on 12/3/24. Has been having trouble breathing the last 2 days. States \" I can't take a full breath in -my diaphragm is not expanding\" -Some shortness of breath at times\" Also having intense pain from sternum to groin. Intolerable pain at surgical site. Not being controlled with pain medication. Patient is going to ER for evaluation.      Reason for Disposition   SEVERE difficulty breathing (e.g., struggling for each breath, speaks in single words, pulse > 120)    Answer Assessment - Initial Assessment Questions  1. RESPIRATORY STATUS: \"Describe your breathing?\" (e.g., wheezing, shortness of breath, unable to speak, severe coughing)       Can't take a full breath  2. ONSET: \"When did this breathing problem begin?\"       2 days ago  3. PATTERN \"Does the difficult breathing come and go, or has it been constant since it started?\"       Constant  4. SEVERITY: \"How bad is your breathing?\" (e.g., mild, moderate, severe)       Moderate to severe  5. RECURRENT SYMPTOM: \"Have you had difficulty breathing before?\" If Yes, ask: \"When was the last time?\" and \"What happened that time?\"       No  6. CARDIAC HISTORY: \"Do you have any history of heart disease?\" (e.g., heart attack, angina, bypass surgery, angioplasty)       No  7. LUNG HISTORY: \"Do you have any history of lung disease?\"  (e.g., pulmonary embolus, asthma, emphysema)      No  8. CAUSE: \"What do you think is causing the breathing problem?\"       Just had hernia surgery  9. OTHER SYMPTOMS: \"Do you have any other symptoms?\" (e.g., chest pain, cough, dizziness, fever, runny nose)      Surgical Pain  10. O2 SATURATION MONITOR:  \"Do you use an oxygen saturation monitor (pulse oximeter) at home?\" If Yes, ask: \"What is your reading (oxygen level) today?\" \"What is your usual oxygen saturation reading?\" (e.g., 95%)        No  11. PREGNANCY: \"Is there any chance you are pregnant?\" \"When was your last " "menstrual period?\"        No  12. TRAVEL: \"Have you traveled out of the country in the last month?\" (e.g., travel history, exposures)        No    Protocols used: Breathing Difficulty-Adult-[place]    "

## 2024-12-05 NOTE — ASSESSMENT & PLAN NOTE
-Patient smokes 1.5ppd, reduced with symptoms today  -Nicotine replacement offered, but patient declined  -Incentive spirometry   -Albuterol ordered for wheezing heard on exam  -Encourage cessation

## 2024-12-05 NOTE — H&P
H&P - Surgery-General   Name: Obi Marie 54 y.o. male I MRN: [id number]  Unit/Bed#: ED 07 I Date of Admission: 12/5/2024   Date of Service: 12/5/2024 I Hospital Day: 0     Assessment & Plan  Post-operative complication  -patient s/p laparoscopic umbilical hernia repair 12/3/24  -developed abdominal pain this morning with associated shortness of breath  -CT c/a/p: No central pulmonary embolism. Hemoperitoneum with hematoma 11.4 x 3.4 x 5.6 cm in the anterior peritoneal cavity to the left of midline. Small foci of contrast extravasation at the inferior medial aspect of the hematoma.   -VSS  -Abdomen soft with generalized tenderness and voluntary guarding  -Hgb 14.3, INR 1.0  -Discussed with Dr Treviño, recommends admitting for observation, q6h H/H and monitoring of vital signs. If h/h drops or vitals worsen, may need IR or surgical intervention. Will continue to follow closely  -Pain control  -IV fluids  -NPO in case of procedure  -Serial abdominal exams  SIRS (systemic inflammatory response syndrome) (HCC)  -Patient met SIRS criteria initially in ED with WBC 16, RR 22, and HR 92, however HR normalized to 70s/80s following pain medication. HR elevation likely related to pain and WBC may be reactive from recent surgery. Less likely sepsis given normal procal, afebrile, normal UA, and no evidence of surgical site or lung infection on exam or imaging, but will continue to monitor closely. Blood cultures obtained in ED. LA 1.2, procal <0.05  Hypomagnesemia  -Mg 1.6, repleted in ED  -Continue to trend and replete as needed.  Tobacco abuse  -Patient smokes 1.5ppd, reduced with symptoms today  -Nicotine replacement offered, but patient declined  -Incentive spirometry   -Albuterol ordered for wheezing heard on exam  -Encourage cessation    History of Present Illness   Obi Marie is a 54 y.o. male with history of hiatal hernia, hyperlipidemia, and recent laparoscopic umbilical hernia repair 12/3/24, who presents  with abdominal pain starting this morning and shortness of breath. Patient states he has pain in abdomen with deep breathing, which is causing him to be short of breath. He has not had a bowel movement since surgery. Patient reported chest pain to ED provider and cardiac workup was negative. He denies nausea/vomiting, difficulty urinating, lightheadedness/dizziness, or other symptoms at this time. Per girlfriend, patient looks more pale than usual.    Review of Systems   Constitutional:  Negative for fever.   Respiratory:  Positive for shortness of breath.    Cardiovascular:  Positive for chest pain.   Gastrointestinal:  Positive for abdominal pain and constipation. Negative for nausea and vomiting.   Genitourinary:  Negative for difficulty urinating.   Neurological:  Negative for dizziness and light-headedness.   All other systems reviewed and are negative.    I have reviewed the patient's PMH, PSH, Social History, Family History, Meds, and Allergies  Historical Information   Past Medical History:   Diagnosis Date    Hiatal hernia     Hyperlipidemia     Onychomycosis of toenail      Past Surgical History:   Procedure Laterality Date    BACK SURGERY      spinal fusion    KNEE SURGERY      AR RPR AA HERNIA 1ST < 3 CM REDUCIBLE N/A 12/3/2024    Procedure: REPAIR HERNIA UMBILICAL;  Surgeon: Marcio Treviño MD;  Location:  MAIN OR;  Service: General     Social History     Tobacco Use    Smoking status: Every Day     Current packs/day: 1.00     Average packs/day: 1 pack/day for 15.0 years (15.0 ttl pk-yrs)     Types: Cigarettes    Smokeless tobacco: Never   Vaping Use    Vaping status: Never Used   Substance and Sexual Activity    Alcohol use: Yes     Comment: Rare     Drug use: Yes     Types: Marijuana     Comment: few times week    Sexual activity: Not on file     E-Cigarette/Vaping    E-Cigarette Use Never User      E-Cigarette/Vaping Substances    Nicotine No     THC No     CBD No     Flavoring No     Other No      Unknown No      Family history non-contributory    Objective :  Temp:  [98.8 °F (37.1 °C)] 98.8 °F (37.1 °C)  HR:  [86-92] 92  BP: (115-143)/(84-90) 115/84  Resp:  [22] 22  SpO2:  [97 %-100 %] 97 %  O2 Device: None (Room air)      Physical Exam  Vitals reviewed.   Constitutional:       General: He is not in acute distress.     Appearance: Normal appearance. He is not toxic-appearing or diaphoretic.   Cardiovascular:      Rate and Rhythm: Normal rate and regular rhythm.      Heart sounds: Normal heart sounds.   Pulmonary:      Effort: Pulmonary effort is normal.      Breath sounds: Wheezing (right lung field) present.   Abdominal:      General: Bowel sounds are normal. There is no distension.      Palpations: Abdomen is soft.      Tenderness: There is abdominal tenderness (generalized, upper>lower). There is guarding (voluntary). There is no rebound.   Skin:     General: Skin is warm and dry.   Neurological:      General: No focal deficit present.      Mental Status: He is alert and oriented to person, place, and time.   Psychiatric:         Mood and Affect: Mood normal.         Behavior: Behavior normal.       Lab Results: I have reviewed the following results:  Recent Labs     12/05/24  1447 12/05/24  1556   WBC 15.90*  --    HGB 14.3  --    HCT 42.5  --      --    SODIUM 136  --    K 3.9  --      --    CO2 23  --    BUN 19  --    CREATININE 0.93  --    GLUC 109  --    MG 1.6*  --    AST 16  --    ALT 22  --    ALB 4.2  --    TBILI 0.48  --    ALKPHOS 63  --    PTT  --  26   INR  --  1.04   HSTNI0 3  --    BNP 52  --    LACTICACID  --  1.2       Imaging Results Review: I reviewed radiology reports from this admission including: CT abdomen/pelvis.  Other Study Results Review: No additional pertinent studies reviewed.    VTE Pharmacologic Prophylaxis: Reason for no pharmacologic prophylaxis active bleeding postoperative  VTE Mechanical Prophylaxis: sequential compression device

## 2024-12-05 NOTE — ASSESSMENT & PLAN NOTE
-patient s/p laparoscopic umbilical hernia repair 12/3/24  -developed abdominal pain this morning with associated shortness of breath  -CT c/a/p: No central pulmonary embolism. Hemoperitoneum with hematoma 11.4 x 3.4 x 5.6 cm in the anterior peritoneal cavity to the left of midline. Small foci of contrast extravasation at the inferior medial aspect of the hematoma.   -VSS  -Abdomen soft with generalized tenderness and voluntary guarding  -Hgb 14.3, INR 1.0  -Discussed with Dr Treviño, recommends admitting for observation, q6h H/H and monitoring of vital signs. If h/h drops or vitals worsen, may need IR or surgical intervention. Will continue to follow closely  -Pain control  -IV fluids  -NPO in case of procedure  -Serial abdominal exams

## 2024-12-05 NOTE — QUICK NOTE
"Patient re-evaluated. Pain poorly controlled with current pain regimen. Abdomen the same- soft with voluntary guarding and generalized tenderness.   /76 (BP Location: Left arm)   Pulse 79   Temp 97.7 °F (36.5 °C) (Temporal)   Resp 22   Ht 5' 9\" (1.753 m)   Wt 96.2 kg (212 lb)   SpO2 96%   BMI 31.31 kg/m²     Pain medication adjusted  Patient moving to telemetry bed  Continue to monitor vitals  H/H q 6h  Incentive spirometry    Dinorah Snell    "

## 2024-12-06 ENCOUNTER — TRANSITIONAL CARE MANAGEMENT (OUTPATIENT)
Dept: FAMILY MEDICINE CLINIC | Facility: CLINIC | Age: 54
End: 2024-12-06

## 2024-12-06 VITALS
SYSTOLIC BLOOD PRESSURE: 109 MMHG | HEART RATE: 77 BPM | WEIGHT: 210.3 LBS | TEMPERATURE: 98.3 F | OXYGEN SATURATION: 92 % | HEIGHT: 69 IN | DIASTOLIC BLOOD PRESSURE: 66 MMHG | RESPIRATION RATE: 17 BRPM | BODY MASS INDEX: 31.15 KG/M2

## 2024-12-06 LAB
ANION GAP SERPL CALCULATED.3IONS-SCNC: 7 MMOL/L (ref 4–13)
ATRIAL RATE: 90 BPM
BASOPHILS # BLD AUTO: 0.04 THOUSANDS/ÂΜL (ref 0–0.1)
BASOPHILS NFR BLD AUTO: 0 % (ref 0–1)
BUN SERPL-MCNC: 19 MG/DL (ref 5–25)
CALCIUM SERPL-MCNC: 8.7 MG/DL (ref 8.4–10.2)
CHLORIDE SERPL-SCNC: 105 MMOL/L (ref 96–108)
CO2 SERPL-SCNC: 24 MMOL/L (ref 21–32)
CREAT SERPL-MCNC: 0.73 MG/DL (ref 0.6–1.3)
EOSINOPHIL # BLD AUTO: 0.12 THOUSAND/ÂΜL (ref 0–0.61)
EOSINOPHIL NFR BLD AUTO: 1 % (ref 0–6)
ERYTHROCYTE [DISTWIDTH] IN BLOOD BY AUTOMATED COUNT: 13.2 % (ref 11.6–15.1)
GFR SERPL CREATININE-BSD FRML MDRD: 106 ML/MIN/1.73SQ M
GLUCOSE P FAST SERPL-MCNC: 111 MG/DL (ref 65–99)
GLUCOSE SERPL-MCNC: 111 MG/DL (ref 65–140)
HCT VFR BLD AUTO: 34.4 % (ref 36.5–49.3)
HCT VFR BLD AUTO: 35.1 % (ref 36.5–49.3)
HGB BLD-MCNC: 11.4 G/DL (ref 12–17)
HGB BLD-MCNC: 11.9 G/DL (ref 12–17)
IMM GRANULOCYTES # BLD AUTO: 0.03 THOUSAND/UL (ref 0–0.2)
IMM GRANULOCYTES NFR BLD AUTO: 0 % (ref 0–2)
LYMPHOCYTES # BLD AUTO: 2.69 THOUSANDS/ÂΜL (ref 0.6–4.47)
LYMPHOCYTES NFR BLD AUTO: 24 % (ref 14–44)
MAGNESIUM SERPL-MCNC: 2 MG/DL (ref 1.9–2.7)
MCH RBC QN AUTO: 30.2 PG (ref 26.8–34.3)
MCHC RBC AUTO-ENTMCNC: 33.9 G/DL (ref 31.4–37.4)
MCV RBC AUTO: 89 FL (ref 82–98)
MONOCYTES # BLD AUTO: 1.02 THOUSAND/ÂΜL (ref 0.17–1.22)
MONOCYTES NFR BLD AUTO: 9 % (ref 4–12)
NEUTROPHILS # BLD AUTO: 7.22 THOUSANDS/ÂΜL (ref 1.85–7.62)
NEUTS SEG NFR BLD AUTO: 66 % (ref 43–75)
NRBC BLD AUTO-RTO: 0 /100 WBCS
P AXIS: 81 DEGREES
PHOSPHATE SERPL-MCNC: 3.8 MG/DL (ref 2.7–4.5)
PLATELET # BLD AUTO: 209 THOUSANDS/UL (ref 149–390)
PMV BLD AUTO: 10.5 FL (ref 8.9–12.7)
POTASSIUM SERPL-SCNC: 3.9 MMOL/L (ref 3.5–5.3)
PR INTERVAL: 138 MS
QRS AXIS: 77 DEGREES
QRSD INTERVAL: 86 MS
QT INTERVAL: 348 MS
QTC INTERVAL: 426 MS
RBC # BLD AUTO: 3.94 MILLION/UL (ref 3.88–5.62)
SODIUM SERPL-SCNC: 136 MMOL/L (ref 135–147)
T WAVE AXIS: 36 DEGREES
VENTRICULAR RATE: 90 BPM
WBC # BLD AUTO: 11.12 THOUSAND/UL (ref 4.31–10.16)

## 2024-12-06 PROCEDURE — 80048 BASIC METABOLIC PNL TOTAL CA: CPT | Performed by: SURGERY

## 2024-12-06 PROCEDURE — 93010 ELECTROCARDIOGRAM REPORT: CPT | Performed by: INTERNAL MEDICINE

## 2024-12-06 PROCEDURE — 85014 HEMATOCRIT: CPT | Performed by: SURGERY

## 2024-12-06 PROCEDURE — 83735 ASSAY OF MAGNESIUM: CPT | Performed by: SURGERY

## 2024-12-06 PROCEDURE — 84100 ASSAY OF PHOSPHORUS: CPT | Performed by: SURGERY

## 2024-12-06 PROCEDURE — 85018 HEMOGLOBIN: CPT | Performed by: SURGERY

## 2024-12-06 PROCEDURE — 99232 SBSQ HOSP IP/OBS MODERATE 35: CPT | Performed by: SURGERY

## 2024-12-06 PROCEDURE — 85025 COMPLETE CBC W/AUTO DIFF WBC: CPT | Performed by: SURGERY

## 2024-12-06 RX ORDER — HYDROCODONE BITARTRATE AND ACETAMINOPHEN 5; 325 MG/1; MG/1
1 TABLET ORAL EVERY 6 HOURS PRN
Refills: 0 | Status: DISCONTINUED | OUTPATIENT
Start: 2024-12-06 | End: 2024-12-06 | Stop reason: HOSPADM

## 2024-12-06 RX ORDER — HYDROCODONE BITARTRATE AND ACETAMINOPHEN 5; 325 MG/1; MG/1
2 TABLET ORAL EVERY 6 HOURS PRN
Refills: 0 | Status: DISCONTINUED | OUTPATIENT
Start: 2024-12-06 | End: 2024-12-06 | Stop reason: HOSPADM

## 2024-12-06 RX ORDER — METHOCARBAMOL 750 MG/1
750 TABLET, FILM COATED ORAL EVERY 8 HOURS PRN
Qty: 20 TABLET | Refills: 0 | Status: SHIPPED | OUTPATIENT
Start: 2024-12-06 | End: 2024-12-17

## 2024-12-06 RX ORDER — ONDANSETRON 2 MG/ML
4 INJECTION INTRAMUSCULAR; INTRAVENOUS EVERY 6 HOURS PRN
Status: DISCONTINUED | OUTPATIENT
Start: 2024-12-06 | End: 2024-12-06 | Stop reason: HOSPADM

## 2024-12-06 RX ORDER — HYDROCODONE BITARTRATE AND ACETAMINOPHEN 5; 325 MG/1; MG/1
1 TABLET ORAL EVERY 6 HOURS PRN
Qty: 6 TABLET | Refills: 0 | Status: SHIPPED | OUTPATIENT
Start: 2024-12-06 | End: 2024-12-17

## 2024-12-06 RX ORDER — DOCUSATE SODIUM 100 MG/1
100 CAPSULE, LIQUID FILLED ORAL 2 TIMES DAILY
Qty: 60 CAPSULE | Refills: 0 | Status: SHIPPED | OUTPATIENT
Start: 2024-12-06 | End: 2024-12-17

## 2024-12-06 RX ORDER — DOCUSATE SODIUM 100 MG/1
100 CAPSULE, LIQUID FILLED ORAL 2 TIMES DAILY
Status: DISCONTINUED | OUTPATIENT
Start: 2024-12-06 | End: 2024-12-06 | Stop reason: HOSPADM

## 2024-12-06 RX ORDER — METHOCARBAMOL 500 MG/1
750 TABLET, FILM COATED ORAL EVERY 6 HOURS SCHEDULED
Status: DISCONTINUED | OUTPATIENT
Start: 2024-12-06 | End: 2024-12-06 | Stop reason: HOSPADM

## 2024-12-06 RX ORDER — LIDOCAINE 50 MG/G
2 PATCH TOPICAL DAILY
Qty: 60 PATCH | Refills: 0 | Status: SHIPPED | OUTPATIENT
Start: 2024-12-07 | End: 2024-12-17

## 2024-12-06 RX ORDER — METHOCARBAMOL 500 MG/1
500 TABLET, FILM COATED ORAL EVERY 6 HOURS SCHEDULED
Status: DISCONTINUED | OUTPATIENT
Start: 2024-12-06 | End: 2024-12-06

## 2024-12-06 RX ORDER — ALBUTEROL SULFATE 90 UG/1
2 INHALANT RESPIRATORY (INHALATION) EVERY 6 HOURS PRN
Qty: 6.7 G | Refills: 0 | Status: SHIPPED | OUTPATIENT
Start: 2024-12-06 | End: 2024-12-17

## 2024-12-06 RX ADMIN — SODIUM CHLORIDE, SODIUM LACTATE, POTASSIUM CHLORIDE, AND CALCIUM CHLORIDE 125 ML/HR: .6; .31; .03; .02 INJECTION, SOLUTION INTRAVENOUS at 02:36

## 2024-12-06 RX ADMIN — DOCUSATE SODIUM 100 MG: 100 CAPSULE, LIQUID FILLED ORAL at 08:12

## 2024-12-06 RX ADMIN — ACETAMINOPHEN 650 MG: 325 TABLET ORAL at 06:10

## 2024-12-06 RX ADMIN — HYDROMORPHONE HYDROCHLORIDE 1 MG: 1 INJECTION, SOLUTION INTRAMUSCULAR; INTRAVENOUS; SUBCUTANEOUS at 00:12

## 2024-12-06 RX ADMIN — HYDROMORPHONE HYDROCHLORIDE 0.5 MG: 1 INJECTION, SOLUTION INTRAMUSCULAR; INTRAVENOUS; SUBCUTANEOUS at 02:36

## 2024-12-06 RX ADMIN — METHOCARBAMOL TABLETS 750 MG: 500 TABLET, COATED ORAL at 08:12

## 2024-12-06 NOTE — QUICK NOTE
"Patient re-evaluated. States medication takes the edge off of his pain but abdomen is still very painful. Denies lightheadedness/dizziness or other symptoms. States he typically has \"low\" BP around 120.    /61   Pulse 80   Temp 97.8 °F (36.6 °C)   Resp 19   Ht 5' 9\" (1.753 m)   Wt 95.4 kg (210 lb 4.8 oz)   SpO2 97%   BMI 31.06 kg/m²   Gen: alert and oriented x 3, mild distress due to pain, resting in chair  Heart: RRR  Lungs: no resp distress, occasional cough  Abdomen: soft, generalized tenderness but worse in upper quadrants, voluntary guarding. Exam similar to previous.  Extremities: moving all independently, motor strength intact    Hgb 12.2 (down from 14.3)    Discussed with Dr Treviño, states hgb 12.2 is expected. Recommends abdominal binder. BP on softer side but still stable and patient without lightheadness. Will continue to follow. Discussed with nurse to contact me if BP decreases or he becomes unstable.    Dinorah Snell    "

## 2024-12-06 NOTE — NURSING NOTE
Surgery PA-C at bedside with RN. Abdominal Binder placed on patient. RN also provided patient with incentive spirometer and educated patient on use. Patient expressed understanding and demonstrated use with RN at bedside.    
Went over discharge instructions with the patient. IVs removed.  
(1) bedfast

## 2024-12-06 NOTE — ASSESSMENT & PLAN NOTE
-Patient met SIRS criteria initially in ED with WBC 16, RR 22, and HR 92, however HR normalized to 70s/80s following pain medication. HR elevation likely related to pain and WBC may be reactive from recent surgery. Less likely sepsis given normal procal, afebrile, normal UA, and no evidence of surgical site or lung infection on exam or imaging, but will continue to monitor closely. Blood cultures obtained in ED. LA 1.2, procal <0.05

## 2024-12-06 NOTE — ASSESSMENT & PLAN NOTE
-patient s/p laparoscopic umbilical hernia repair 12/3/24  -developed abdominal pain this morning with associated shortness of breath  -CT c/a/p: No central pulmonary embolism. Hemoperitoneum with hematoma 11.4 x 3.4 x 5.6 cm in the anterior peritoneal cavity to the left of midline. Small foci of contrast extravasation at the inferior medial aspect of the hematoma.   -VSS  -Abdomen soft with generalized tenderness and voluntary guarding  -serial hgb performed, continues to be stable  -Serial abdominal exams  -exam improved this am, if repeat hgb is stable. Ok for diet and then d/c the patient

## 2024-12-06 NOTE — PLAN OF CARE
Problem: INFECTION - ADULT  Goal: Absence or prevention of progression during hospitalization  Description: INTERVENTIONS:  - Assess and monitor for signs and symptoms of infection  - Monitor lab/diagnostic results  - Monitor all insertion sites, i.e. indwelling lines, tubes, and drains  - Monitor endotracheal if appropriate and nasal secretions for changes in amount and color  - [place] appropriate cooling/warming therapies per order  - Administer medications as ordered  - Instruct and encourage patient and family to use good hand hygiene technique  - Identify and instruct in appropriate isolation precautions for identified infection/condition  Outcome: Progressing  Goal: Absence of fever/infection during neutropenic period  Description: INTERVENTIONS:  - Monitor WBC    Outcome: Progressing     Problem: PAIN - ADULT  Goal: Verbalizes/displays adequate comfort level or baseline comfort level  Description: Interventions:  - Encourage patient to monitor pain and request assistance  - Assess pain using appropriate pain scale  - Administer analgesics based on type and severity of pain and evaluate response  - Implement non-pharmacological measures as appropriate and evaluate response  - Consider cultural and social influences on pain and pain management  - Notify physician/advanced practitioner if interventions unsuccessful or patient reports new pain  Outcome: Progressing     Problem: DISCHARGE PLANNING  Goal: Discharge to home or other facility with appropriate resources  Description: INTERVENTIONS:  - Identify barriers to discharge w/patient and caregiver  - Arrange for needed discharge resources and transportation as appropriate  - Identify discharge learning needs (meds, wound care, etc.)  - Arrange for interpretive services to assist at discharge as needed  - Refer to Case Management Department for coordinating discharge planning if the patient needs post-hospital services based on physician/advanced  practitioner order or complex needs related to functional status, cognitive ability, or social support system  Outcome: Progressing

## 2024-12-06 NOTE — PROGRESS NOTES
Progress Note - Surgery-General   Name: Obi Marie 54 y.o. male I MRN: [id number]  Unit/Bed#: [facility] I Date of Admission: 12/5/2024   Date of Service: 12/6/2024 I Hospital Day: 0    Assessment & Plan  Post-operative complication  -patient s/p laparoscopic umbilical hernia repair 12/3/24  -developed abdominal pain this morning with associated shortness of breath  -CT c/a/p: No central pulmonary embolism. Hemoperitoneum with hematoma 11.4 x 3.4 x 5.6 cm in the anterior peritoneal cavity to the left of midline. Small foci of contrast extravasation at the inferior medial aspect of the hematoma.   -VSS  -Abdomen soft with generalized tenderness and voluntary guarding  -serial hgb performed, continues to be stable  -Serial abdominal exams  -exam improved this am, if repeat hgb is stable. Ok for diet and then d/c the patient   SIRS (systemic inflammatory response syndrome) (HCC)  -Patient met SIRS criteria initially in ED with WBC 16, RR 22, and HR 92, however HR normalized to 70s/80s following pain medication. HR elevation likely related to pain and WBC may be reactive from recent surgery. Less likely sepsis given normal procal, afebrile, normal UA, and no evidence of surgical site or lung infection on exam or imaging, but will continue to monitor closely. Blood cultures obtained in ED. LA 1.2, procal <0.05  -blood cx noted, will follow  Hypomagnesemia  -Mg 1.6, repleted in ED  -Continue to trend and replete as needed.  Tobacco abuse  -Patient smokes 1.5ppd, reduced with symptoms today  -Nicotine replacement offered, but patient declined  -Incentive spirometry   -Albuterol ordered for wheezing heard on exam  -Encourage cessation    Please contact the SecureChat role for the Surgery-General service with any questions/concerns.    Subjective   Doing well this am  Feeling much better    Objective :  Temp:  [97.1 °F (36.2 °C)-98.8 °F (37.1 °C)] 98.3 °F (36.8 °C)  HR:  [76-92] 77  BP: ()/(61-90) 109/66  Resp:   [16-22] 17  SpO2:  [92 %-100 %] 92 %  O2 Device: None (Room air)    I/O         12/04 0701 12/05 0700 12/05 0701 12/06 0700 12/06 0701 12/07 0700    P.O.  0     IV Piggyback  1050     Total Intake(mL/kg)  1050 (11)     Urine (mL/kg/hr)  0 325 (0.7)    Total Output  0 325    Net  +1050 -325           Unmeasured Urine Occurrence  1 x             Physical Exam  General Appearance: NAD, cooperative, alert  Eyes: Anicteric, conjunctiva clear  HENT:  Normocephalic, atraumatic, normal mucosa.  external ears normal, external nose normal, no drainage  Neck:    Supple, symmetrical, trachea midline  Resp:  Clear to auscultation bilaterally; no rales, rhonchi or wheezing; respirations unlabored   CV:  S1 S2, Regular rate and rhythm; no murmur, rub, or gallop.  GI:  Soft, minimally ttp over the incision sites and LLQ, no guarding, non-distended; normal BS  Musculoskeletal: No cyanosis, clubbing or edema. Normal ROM.  Skin:   No jaundice, rashes, or lesions   Psych: Normal affect, good eye contact  Neuro: No gross deficits, AAOx3, speech nl, desai      Lab Results: I have reviewed the following results:  Recent Labs     12/05/24  1447 12/05/24  1556 12/05/24  1633 12/05/24  2132 12/06/24  0253 12/06/24  1051   WBC 15.90*  --   --   --  11.12*  --    HGB 14.3  --   --    < > 11.9* 11.4*   HCT 42.5  --   --    < > 35.1* 34.4*     --   --   --  209  --    SODIUM 136  --   --   --  136  --    K 3.9  --   --   --  3.9  --      --   --   --  105  --    CO2 23  --   --   --  24  --    BUN 19  --   --   --  19  --    CREATININE 0.93  --   --   --  0.73  --    GLUC 109  --   --   --  111  --    MG 1.6*  --   --   --  2.0  --    PHOS  --   --   --   --  3.8  --    AST 16  --   --   --   --   --    ALT 22  --   --   --   --   --    ALB 4.2  --   --   --   --   --    TBILI 0.48  --   --   --   --   --    ALKPHOS 63  --   --   --   --   --    PTT  --  26  --   --   --   --    INR  --  1.04  --   --   --   --    HSTNI0 3  --   --    --   --   --    HSTNI2  --   --  3  --   --   --    BNP 52  --   --   --   --   --    LACTICACID  --  1.2  --   --   --   --     < > = values in this interval not displayed.       Imaging Results Review: I reviewed radiology reports from this admission including: CT abdomen/pelvis.  Other Study Results Review: No additional pertinent studies reviewed.    VTE Pharmacologic Prophylaxis: VTE covered by:    None     VTE Mechanical Prophylaxis: sequential compression device

## 2024-12-06 NOTE — PLAN OF CARE

## 2024-12-06 NOTE — CASE MANAGEMENT
Case Management Assessment & Discharge Planning Note    Patient name Obi Marie  Location /-01 MRN [id number]  : 1970 Date 2024       Current Admission Date: 2024  Current Admission Diagnosis:Post-operative complication   Patient Active Problem List    Diagnosis Date Noted Date Diagnosed    Post-operative complication 2024     Hypomagnesemia 2024     Tobacco abuse 2024     SIRS (systemic inflammatory response syndrome) (HCC) 2024     Lumbar radiculopathy 2022     Lumbar disc herniation      Lumbar foraminal stenosis      Mondor's disease 2021     Umbilical hernia without obstruction and without gangrene 2021     Hyperglycemia 2021     Current smoker 05/10/2018     Vitamin D deficiency 2014     Hyperlipidemia 2012       LOS (days): 0  Geometric Mean LOS (GMLOS) (days):   Days to GMLOS:     OBJECTIVE:              Current admission status: Observation       Preferred Pharmacy:   [id number] [address] [address]  Phone: [phone] Fax: [phone]    Primary Care Provider: Saima Corbin PA-C    Primary Insurance: [employer] [facility]  Secondary Insurance:     ASSESSMENT:  Active Health Care Proxies    There are no active Health Care Proxies on file.       Advance Directives  Does patient have a Health Care POA?: No  Was patient offered paperwork?: Yes  Does patient currently have a Health Care decision maker?: No  Does patient have Advance Directives?: No  Was patient offered paperwork?: Yes    Obs Notice Signed: 24    Readmission Root Cause  30 Day Readmission: No    Patient Information  Admitted from:: Home  Mental Status: Alert  During Assessment patient was accompanied by: Other-Comment (SO)  Assessment information provided by:: Patient  Primary Caregiver: Self  Support Systems: Spouse/significant other, Self  County of  Residence: [place]  What city do you live in?: [place]  Home entry access options. Select all that apply.: No steps to enter home  Type of Current Residence: [facility]  Living Arrangements: Lives w/ Spouse/significant other  Is patient a ?: No    Activities of Daily Living Prior to Admission  Functional Status: Independent  Completes ADLs independently?: Yes  Ambulates independently?: Yes  Does patient use assisted devices?: Yes  Assisted Devices (DME) used: Straight Cane  Does patient currently own DME?: Yes  What DME does the patient currently own?: Straight Cane  Does patient have a history of Outpatient Therapy (PT/OT)?: No  Does the patient have a history of Short-Term Rehab?: No  Does patient have a history of HHC?: No  Does patient currently have HHC?: No         Patient Information Continued  Income Source: Employed  Does patient have prescription coverage?: Yes  Does patient receive dialysis treatments?: No  Does patient have a history of substance abuse?: No  Does patient have a history of Mental Health Diagnosis?: No         Means of Transportation  Means of Transport to [facility]:: Drives Self          DISCHARGE DETAILS:    Discharge planning discussed with:: Pt and SO  Freedom of Choice: Yes     CM contacted family/caregiver?: Yes  Were Treatment Team discharge recommendations reviewed with patient/caregiver?: Yes  Did patient/caregiver verbalize understanding of patient care needs?: Yes  Were patient/caregiver advised of the risks associated with not following Treatment Team discharge recommendations?: Yes    Contacts  Patient Contacts: Diamante Del Rosario  Relationship to Patient:: Friend  Contact Method: In Person  Reason/Outcome: Referral, Emergency Contact, Continuity of Care    Requested Home Health Care         Is the patient interested in HHC at discharge?: No    DME Referral Provided  Referral made for DME?: No    Other Referral/Resources/Interventions Provided:  Interventions: None Indicated          Treatment Team Recommendation: Home  Discharge Destination Plan:: Home                                         Additional Comments: Cm with pt and SO at bedside to discern dc needs. Lives with SO in a 1sh, 0STE, 1st fl setup. Reports being independent with ADLs and walking. Uses and owns cane. Employed and drives.  No hx of OPPT, HHC, and STR. No inpatient psych or D&A treatment. Does not have a medical POA, but stated in the process of obtaining. SO will provide transport at discharge.

## 2024-12-06 NOTE — DISCHARGE INSTR - AVS FIRST PAGE
[facility] General Surgery [place]  Post-Operative Care Instructions  Dr. Marcio Treviño MD, [facility]  [phone]    1. General: You will feel pulling sensations around the wound or funny aches and pains around the incisions. This is normal. Even minor surgery is a change in your body and this is your body's way of reacting to it. If you have had abdominal surgery, it may help to support the incision with a small pillow or blanket for comfort when moving or coughing.    2. Wound care:  Okay to shower.  The glue will fall off over the next week or 2.   Use ice for the first 5 days after surgery.  Do not use for longer than 20 minutes at a time. Use ice 5 times per day.    3. Water: You may shower over the wounds. Do not bathe or use a pool or hot tub until cleared by the physician.   If you were discharged with a drain, make sure drain site is covered with plastic wrap before showering.    4. Activity: You may go up and down stairs, walk as much as you are comfortable, but walk at least 3 times each day. If you have had abdominal surgery, do not lift anything heavier than 15 lbs for the 1st 2 weeks and 25 lbs for weeks 3 and 4.   Use the binder at all times, may remove the binder for showering  Use the incentive spirometer    5. Diet: You may resume a regular diet. If you had a same-day surgery or overnight stay surgery, you may wish to eat lightly for a few days: soups, crackers, and sandwiches. You may resume a regular diet when ready.    6. Medications: Resume all of your previous medications, unless told otherwise by the doctor. Avoid aspirin products and motrin/ibuprofen    If you were prescribed a narcotic pain medication containing Tylenol, such as Percocet or Norco, do not use supplemental Tylenol. Limit tylenol use to 4gm in a day  You may use robaxin and lidocaine     7. Driving: You will need someone to drive you home on the day of surgery or discharge. Do not drive or make any important decisions while  on narcotic pain medication or 24 hours and after anesthesia or sedation for surgery. Generally, you may drive when your off all narcotic pain medications and you are comfortable.     8. Upset Stomach: You may take Maalox, Tums, or similar items for an upset stomach. If your narcotic pain medication causes an upset stomach, do not take it on an empty stomach. Try taking it with at least some crackers or toast.     9. Constipation: Patients often experience constipation after surgery. You may take over-the-counter medication for this, such as Metamucil, Senokot, Dulcolax, milk of magnesia, etc. You may take a suppository unless you have had anorectal surgery such as a procedure on your hemorrhoids. If you experience significant nausea or vomiting after abdominal surgery, call the office before trying any of these medications.    10. Call the office: If you are experiencing any of the following: fevers above 101.5°, significant nausea or vomiting, if the wound develops drainage and/or there is excessive redness around the wound, or if you have significant diarrhea or other worsening symptoms.    11. Pain: You may be given a prescription for pain medication.  This will be sent to your pharmacy prior to discharge.    12. Sexual Activity: You may resume sexual activity when you feel ready and comfortable and your incision is sealed and healed without apparent infection risk.    13. Urination: If you have not urinated in 6 hours, go directly to the ER for evaluation for urinary retention.     14. Follow-up in 2 weeks.

## 2024-12-06 NOTE — ASSESSMENT & PLAN NOTE
-Patient met SIRS criteria initially in ED with WBC 16, RR 22, and HR 92, however HR normalized to 70s/80s following pain medication. HR elevation likely related to pain and WBC may be reactive from recent surgery. Less likely sepsis given normal procal, afebrile, normal UA, and no evidence of surgical site or lung infection on exam or imaging, but will continue to monitor closely. Blood cultures obtained in ED. LA 1.2, procal <0.05  -blood cx noted, will follow

## 2024-12-08 LAB
BACTERIA BLD CULT: NORMAL
BACTERIA BLD CULT: NORMAL

## 2024-12-10 LAB
ATRIAL RATE: 77 BPM
ATRIAL RATE: 78 BPM
ATRIAL RATE: 83 BPM
BACTERIA BLD CULT: NORMAL
BACTERIA BLD CULT: NORMAL
P AXIS: 74 DEGREES
P AXIS: 75 DEGREES
P AXIS: 83 DEGREES
PR INTERVAL: 136 MS
PR INTERVAL: 144 MS
PR INTERVAL: 148 MS
QRS AXIS: 75 DEGREES
QRS AXIS: 81 DEGREES
QRS AXIS: 81 DEGREES
QRSD INTERVAL: 84 MS
QRSD INTERVAL: 90 MS
QRSD INTERVAL: 94 MS
QT INTERVAL: 364 MS
QT INTERVAL: 370 MS
QT INTERVAL: 396 MS
QTC INTERVAL: 414 MS
QTC INTERVAL: 434 MS
QTC INTERVAL: 448 MS
T WAVE AXIS: 31 DEGREES
T WAVE AXIS: 40 DEGREES
T WAVE AXIS: 48 DEGREES
VENTRICULAR RATE: 77 BPM
VENTRICULAR RATE: 78 BPM
VENTRICULAR RATE: 83 BPM

## 2024-12-10 PROCEDURE — 93010 ELECTROCARDIOGRAM REPORT: CPT | Performed by: INTERNAL MEDICINE

## 2024-12-16 ENCOUNTER — OFFICE VISIT (OUTPATIENT)
Dept: SURGERY | Facility: HOSPITAL | Age: 54
End: 2024-12-16
Payer: COMMERCIAL

## 2024-12-16 VITALS
TEMPERATURE: 97.9 F | HEIGHT: 69 IN | WEIGHT: 209.4 LBS | HEART RATE: 73 BPM | SYSTOLIC BLOOD PRESSURE: 122 MMHG | DIASTOLIC BLOOD PRESSURE: 78 MMHG | BODY MASS INDEX: 31.01 KG/M2

## 2024-12-16 DIAGNOSIS — Z09 S/P UMBILICAL HERNIA REPAIR, FOLLOW-UP EXAM: Primary | ICD-10-CM

## 2024-12-16 PROCEDURE — 99213 OFFICE O/P EST LOW 20 MIN: CPT | Performed by: SURGERY

## 2024-12-17 ENCOUNTER — OFFICE VISIT (OUTPATIENT)
Dept: FAMILY MEDICINE CLINIC | Facility: CLINIC | Age: 54
End: 2024-12-17
Payer: COMMERCIAL

## 2024-12-17 VITALS
DIASTOLIC BLOOD PRESSURE: 80 MMHG | RESPIRATION RATE: 16 BRPM | OXYGEN SATURATION: 97 % | HEART RATE: 76 BPM | HEIGHT: 69 IN | WEIGHT: 208 LBS | TEMPERATURE: 98.5 F | SYSTOLIC BLOOD PRESSURE: 124 MMHG | BODY MASS INDEX: 30.81 KG/M2

## 2024-12-17 DIAGNOSIS — K91.871: ICD-10-CM

## 2024-12-17 DIAGNOSIS — R91.8 ABNORMAL CT LUNG SCREENING: Primary | ICD-10-CM

## 2024-12-17 DIAGNOSIS — R65.10 SIRS (SYSTEMIC INFLAMMATORY RESPONSE SYNDROME) (HCC): ICD-10-CM

## 2024-12-17 DIAGNOSIS — E83.42 HYPOMAGNESEMIA: ICD-10-CM

## 2024-12-17 DIAGNOSIS — Z72.0 TOBACCO ABUSE: Chronic | ICD-10-CM

## 2024-12-17 PROBLEM — K42.9 UMBILICAL HERNIA WITHOUT OBSTRUCTION AND WITHOUT GANGRENE: Status: RESOLVED | Noted: 2021-05-28 | Resolved: 2024-12-17

## 2024-12-17 PROCEDURE — 99495 TRANSJ CARE MGMT MOD F2F 14D: CPT | Performed by: PHYSICIAN ASSISTANT

## 2024-12-17 NOTE — PROGRESS NOTES
Transition of Care Visit  Name: Obi Marie      : 1970      MRN: [id number]  Encounter Provider: Saima Corbin PA-C  Encounter Date: 2024   Encounter department: [facility]    Assessment & Plan  Abnormal CT lung screening    Orders:    CT chest wo contrast; Future       Post-operative complication  -patient s/p laparoscopic umbilical hernia repair 12/3/24, complicaiton of Hemoperitoneum with hematoma 11.4 x 3.4 x 5.6 cm in the anterior peritoneal cavity to the left of midline. Small foci of contrast extravasation at the inferior medial aspect of the hematoma. Patient improving, followed up with general surgery who advised following up with hemoglobin was not necessary although I did offer for patient today for reassurance.    SIRS (systemic inflammatory response syndrome) (HCC)  -Patient met SIRS criteria initially in ED but work up negative    Hypomagnesemia  -Mg 1.6, repleted in ED    Tobacco abuse  -Patient smokes 1.5ppd, not interested in cessation    Abnormal CT chest  - hilar adenopathy noted. Repeat CT 6 months      History of Present Illness     Transitional Care Management Review:   Obi Marie is a 54 y.o. male here for TCM follow up.     During the TCM phone call patient stated:  TCM Call       Date and time call was made  2024  2:58 PM    Hospital care reviewed  Records reviewed    Patient was hospitialized at  [facility]    Date of Admission  24    Date of discharge  24    Diagnosis  Post-operative complication    Disposition  Home          TCM Call       Scheduled for follow up?  Yes    Did you obtain your prescribed medications  Yes    Do you need help managing your prescriptions or medications  No    Is transportation to your appointment needed  No    I have advised the patient to call PCP with any new or worsening symptoms  Dinorah MARTIN    Counseling  Patient          12/3 patient had an  "out patient hernia repair, was discharged home without complication, was back in ER on 12/5 with increasing abdominal pain. Was evaluated and admitted. Found to have small hematoma and blood in cavity. Observed with serial hemoglobin and numbers stable so discharged home. Patient has been improving daily, saw surgeon yesterday and was pleased with improvement. Moving bowels normally. Urinating normally. Denies fever, chills.       Review of Systems  Objective   /80   Pulse 76   Temp 98.5 °F (36.9 °C) (Temporal)   Resp 16   Ht 5' 9\" (1.753 m)   Wt 94.3 kg (208 lb)   SpO2 97%   BMI 30.72 kg/m²     Physical Exam  Constitutional:       General: He is not in acute distress.     Appearance: Normal appearance. He is normal weight. He is not toxic-appearing.   HENT:      Head: Normocephalic and atraumatic.   Cardiovascular:      Rate and Rhythm: Normal rate and regular rhythm.      Pulses: Normal pulses.      Heart sounds: Normal heart sounds.   Pulmonary:      Effort: Pulmonary effort is normal.      Breath sounds: Normal breath sounds.   Abdominal:      General: Bowel sounds are normal.      Palpations: Abdomen is soft.      Tenderness: There is generalized abdominal tenderness. There is no guarding or rebound.   Skin:     General: Skin is warm.      Comments: Surgical wound healing nicely   Neurological:      General: No focal deficit present.      Mental Status: He is alert and oriented to person, place, and time.   Psychiatric:         Mood and Affect: Mood normal.         Behavior: Behavior normal.         Thought Content: Thought content normal.         Judgment: Judgment normal.       Medications have been reviewed by provider in current encounter      "

## 2024-12-19 ENCOUNTER — NURSE TRIAGE (OUTPATIENT)
Age: 54
End: 2024-12-19

## 2024-12-19 NOTE — TELEPHONE ENCOUNTER
"Patient called in to report that he is having increase in pain 3 inches under surgery site and over to the left in line with leg. He had Umbilical hernia surgery 12/3/24. On 12/5/24 he had a post surgery complication of hematoma and pain. He says the pain is becoming sharp again. 4/10. Afebrile. I asked him if he is moving his bowels. He said he did move them this am but, not much. Not passing much flatus. He took a colace while we were on the phone and will increase fluid intake.   Please review in light of complicaiton of hemoperitoneum with hematoma post surgery and advise patient. Thank you    Reason for Disposition   Mild abdominal pain    Answer Assessment - Initial Assessment Questions  1. LOCATION: \"Where does it hurt?\"       3 inches below umbilical-to left abdomen  2. RADIATION: \"Does the pain shoot anywhere else?\" (e.g., chest, back)      no  3. ONSET: \"When did the pain begin?\" (Minutes, hours or days ago)       ongoing  4. SUDDEN: \"Gradual or sudden onset?\"      Been there since surgery but, is getting sharper  5. PATTERN \"Does the pain come and go, or is it constant?\"      Constant   6. SEVERITY: \"How bad is the pain?\"  (e.g., Scale 1-10; mild, moderate, or severe)      4/10  7. RECURRENT SYMPTOM: \"Have you ever had this type of stomach pain before?\" If Yes, ask: \"When was the last time?\" and \"What happened that time?\"       Recent surgery  8. CAUSE: \"What do you think is causing the stomach pain?\"      unknown  9. RELIEVING/AGGRAVATING FACTORS: \"What makes it better or worse?\" (e.g., antacids, bending or twisting motion, bowel movement)      Moving around  10. OTHER SYMPTOMS: \"Do you have any other symptoms?\" (e.g., back pain, diarrhea, fever, urination pain, vomiting)        no    Protocols used: Abdominal Pain - Male-Adult-[place]    "

## 2024-12-20 ENCOUNTER — OFFICE VISIT (OUTPATIENT)
Dept: SURGERY | Facility: HOSPITAL | Age: 54
End: 2024-12-20
Payer: COMMERCIAL

## 2024-12-20 VITALS — TEMPERATURE: 98.6 F | DIASTOLIC BLOOD PRESSURE: 87 MMHG | SYSTOLIC BLOOD PRESSURE: 139 MMHG | HEART RATE: 85 BPM

## 2024-12-20 DIAGNOSIS — Z09 S/P UMBILICAL HERNIA REPAIR, FOLLOW-UP EXAM: ICD-10-CM

## 2024-12-20 DIAGNOSIS — D64.9 ANEMIA, UNSPECIFIED TYPE: Primary | ICD-10-CM

## 2024-12-20 DIAGNOSIS — R10.32 LEFT LOWER QUADRANT ABDOMINAL PAIN: ICD-10-CM

## 2024-12-20 PROCEDURE — 99213 OFFICE O/P EST LOW 20 MIN: CPT | Performed by: SURGERY

## 2024-12-21 LAB
HCT VFR BLD AUTO: 38.9 % (ref 38.5–50)
HGB BLD-MCNC: 12.7 G/DL (ref 13.2–17.1)

## 2024-12-23 NOTE — PROGRESS NOTES
Assessment/Plan:   Obi Marie is a 54 y.o.male who is here for Post-op (UMB HERNIA 12/3/2024//PT stated he has a slight pain/discomfort but it was expected, no changes in eating habits, bowels are returning to normal. The glue from the sites began to come off.)  .    Plan: s/p umbilical hernia repair - cont light duty for two weeks, f/u prn    Preoperative Clearance: None    HPI:  Obi Marie is a 54 y.o.male who was referred for evaluation of Post-op (UMB HERNIA 12/3/2024//PT stated he has a slight pain/discomfort but it was expected, no changes in eating habits, bowels are returning to normal. The glue from the sites began to come off.)  .    Currently minimal pain.     ROS:  General ROS: negative  negative for - chills, fatigue, fever or night sweats, weight loss  Respiratory ROS: no cough, shortness of breath, or wheezing  Cardiovascular ROS: no chest pain or dyspnea on exertion  Genito-Urinary ROS: no dysuria, trouble voiding, or hematuria  Musculoskeletal ROS: negative for - gait disturbance, joint pain or muscle pain  Neurological ROS: no TIA or stroke symptoms  abdominal pain    [unfilled]  Oxycodone    Current Outpatient Medications:     Cholecalciferol (VITAMIN D3) 400 units CAPS, Take 1 capsule by mouth daily, Disp: , Rfl:   Past Medical History:   Diagnosis Date    Hiatal hernia     Hyperlipidemia     Onychomycosis of toenail      Past Surgical History:   Procedure Laterality Date    BACK SURGERY      spinal fusion    KNEE SURGERY      IL RPR AA HERNIA 1ST < 3 CM REDUCIBLE N/A 12/3/2024    Procedure: REPAIR HERNIA UMBILICAL;  Surgeon: Marcio Treviño MD;  Location:  MAIN OR;  Service: General     Family History   Problem Relation Age of Onset    Thyroid cancer Mother     Lung cancer Mother     Coronary artery disease Father     Hyperlipidemia Sister     Hyperlipidemia Brother     Substance Abuse Neg Hx     Mental illness Neg Hx       reports that he has been smoking cigarettes. He has a  "15 pack-year smoking history. He has never used smokeless tobacco. He reports current alcohol use. He reports current drug use. Drug: Marijuana.    Labs:   Lab Results   Component Value Date    WBC 11.12 (H) 12/06/2024    WBC 15.90 (H) 12/05/2024    WBC 8.2 09/21/2024    WBC 10.2 04/22/2021    WBC 5.8 04/28/2018    WBC 6.5 02/27/2017    HGB 12.7 (L) 12/20/2024    HGB 11.4 (L) 12/06/2024    HGB 11.9 (L) 12/06/2024    HGB 12.2 12/05/2024    HGB 15.2 02/27/2017     12/06/2024     12/05/2024     09/21/2024     04/22/2021     04/28/2018     02/27/2017     Lab Results   Component Value Date    ALT 22 12/05/2024    ALT 17 09/21/2024    ALT 19 08/03/2022    ALT 21 04/22/2021    AST 16 12/05/2024    AST 16 09/21/2024    AST 19 08/03/2022    AST 23 04/22/2021     This SmartLink has not been configured with any valid records.       PHYSICAL EXAM  General Appearance:    Alert, cooperative, no distress,    Head:    Normocephalic without obvious abnormality   Eyes:    PERRL, conjunctiva/corneas clear, EOM's intact        Neck:   Supple, no adenopathy, no JVD   Back:     Symmetric, no spinal or CVA tenderness   Lungs:     Clear to auscultation bilaterally, no wheezing or rhonchi   Heart:    Regular rate and rhythm, S1 and S2 normal, no murmur   Abdomen:     Soft +BS ND NT incision cdi   Extremities:   Extremities normal. No clubbing, cyanosis or edema   Psych:   Normal Affect, AOx3.    Neurologic:  Skin:   CNII-XII intact. Strength symmetric, speech intact    Warm, dry, intact, no visible rashes or lesions         Physical Exam              Some portions of this record may have been generated with voice recognition software. There may be translation, syntax,  or grammatical errors. Occasional wrong word or \"sound-a-like\" substitutions may have occurred due to the inherent limitations of the voice recognition software. Read the chart carefully and recognize, using context, where " substitutions may have occurred. If you have any questions, please contact the dictating provider for clarification or correction, as needed. This encounter has been coded by a non-certified coder.

## 2024-12-23 NOTE — PROGRESS NOTES
Assessment/Plan:   Obi Marie is a 54 y.o.male who is here for Post-op (PT has returned for a visit due to having a continued pain in the umb and lower left abdominal area. He stated it was worse yesterday, but took ibuprofen and it removed the sharpness of the pain.Bowel movements are normal. )  .    Plan: s/p umbilical hernia repair/hematoma - returned bc of increased pain and given h/o hematoma seen in the office, clinically exam is relatively benign and patient say pain better than yesterday, cont rest, ice/heat, NSAIDs/tylenol, hgb check and actually improved    Preoperative Clearance: None    HPI:  Obi Marie is a 54 y.o.male who was referred for evaluation of Post-op (PT has returned for a visit due to having a continued pain in the umb and lower left abdominal area. He stated it was worse yesterday, but took ibuprofen and it removed the sharpness of the pain.Bowel movements are normal. )  .    Currently abd pain but improved.     ROS:  General ROS: negative  negative for - chills, fatigue, fever or night sweats, weight loss  Respiratory ROS: no cough, shortness of breath, or wheezing  Cardiovascular ROS: no chest pain or dyspnea on exertion  Genito-Urinary ROS: no dysuria, trouble voiding, or hematuria  Musculoskeletal ROS: negative for - gait disturbance, joint pain or muscle pain  Neurological ROS: no TIA or stroke symptoms  abdominal pain    [unfilled]  Oxycodone    Current Outpatient Medications:     Cholecalciferol (VITAMIN D3) 400 units CAPS, Take 1 capsule by mouth daily, Disp: , Rfl:   Past Medical History:   Diagnosis Date    Hiatal hernia     Hyperlipidemia     Onychomycosis of toenail      Past Surgical History:   Procedure Laterality Date    BACK SURGERY      spinal fusion    KNEE SURGERY      DC RPR AA HERNIA 1ST < 3 CM REDUCIBLE N/A 12/3/2024    Procedure: REPAIR HERNIA UMBILICAL;  Surgeon: Marcio Treviño MD;  Location:  MAIN OR;  Service: General     Family History   Problem  Relation Age of Onset    Thyroid cancer Mother     Lung cancer Mother     Coronary artery disease Father     Hyperlipidemia Sister     Hyperlipidemia Brother     Substance Abuse Neg Hx     Mental illness Neg Hx       reports that he has been smoking cigarettes. He has a 15 pack-year smoking history. He has never used smokeless tobacco. He reports current alcohol use. He reports current drug use. Drug: Marijuana.    Labs:   Lab Results   Component Value Date    WBC 11.12 (H) 12/06/2024    WBC 15.90 (H) 12/05/2024    WBC 8.2 09/21/2024    WBC 10.2 04/22/2021    WBC 5.8 04/28/2018    WBC 6.5 02/27/2017    HGB 12.7 (L) 12/20/2024    HGB 11.4 (L) 12/06/2024    HGB 11.9 (L) 12/06/2024    HGB 12.2 12/05/2024    HGB 15.2 02/27/2017     12/06/2024     12/05/2024     09/21/2024     04/22/2021     04/28/2018     02/27/2017     Lab Results   Component Value Date    ALT 22 12/05/2024    ALT 17 09/21/2024    ALT 19 08/03/2022    ALT 21 04/22/2021    AST 16 12/05/2024    AST 16 09/21/2024    AST 19 08/03/2022    AST 23 04/22/2021     This SmartLink has not been configured with any valid records.       PHYSICAL EXAM  General Appearance:    Alert, cooperative, no distress,    Head:    Normocephalic without obvious abnormality   Eyes:    PERRL, conjunctiva/corneas clear, EOM's intact        Neck:   Supple, no adenopathy, no JVD   Back:     Symmetric, no spinal or CVA tenderness   Lungs:     Clear to auscultation bilaterally, no wheezing or rhonchi   Heart:    Regular rate and rhythm, S1 and S2 normal, no murmur   Abdomen:     Soft +BS ND mild TTP in left abd   Extremities:   Extremities normal. No clubbing, cyanosis or edema   Psych:   Normal Affect, AOx3.    Neurologic:  Skin:   CNII-XII intact. Strength symmetric, speech intact    Warm, dry, intact, no visible rashes or lesions         Physical Exam              Some portions of this record may have been generated with voice recognition  "software. There may be translation, syntax,  or grammatical errors. Occasional wrong word or \"sound-a-like\" substitutions may have occurred due to the inherent limitations of the voice recognition software. Read the chart carefully and recognize, using context, where substitutions may have occurred. If you have any questions, please contact the dictating provider for clarification or correction, as needed. This encounter has been coded by a non-certified coder.   "

## 2025-01-03 ENCOUNTER — TELEPHONE (OUTPATIENT)
Age: 55
End: 2025-01-03

## 2025-01-03 NOTE — TELEPHONE ENCOUNTER
Patient called stating that he had recent surgery and wanted to know if PCP would complete short term disability paperwork.  Patient wanted to know if appointment needed or if he can bring paperwork to office.  Please advise and contact patient.

## 2025-01-03 NOTE — TELEPHONE ENCOUNTER
Pt of Dr. Tam calling in regarding disability paperwork. Pt reports he has paperwork to drop off to the office. Pt states he will stop by sometime next week to drop this off.

## 2025-04-28 ENCOUNTER — HOSPITAL ENCOUNTER (EMERGENCY)
Facility: HOSPITAL | Age: 55
Discharge: HOME | End: 2025-04-28
Attending: EMERGENCY MEDICINE
Payer: COMMERCIAL

## 2025-04-28 VITALS
HEART RATE: 86 BPM | TEMPERATURE: 97.6 F | SYSTOLIC BLOOD PRESSURE: 147 MMHG | DIASTOLIC BLOOD PRESSURE: 88 MMHG | RESPIRATION RATE: 18 BRPM | OXYGEN SATURATION: 98 %

## 2025-04-28 DIAGNOSIS — S05.00XA CORNEAL ABRASION: ICD-10-CM

## 2025-04-28 DIAGNOSIS — T15.92XA FOREIGN BODY OF LEFT EYE, INITIAL ENCOUNTER: Primary | ICD-10-CM

## 2025-04-28 PROCEDURE — 90471 IMMUNIZATION ADMIN: CPT

## 2025-04-28 PROCEDURE — 65220 REMOVE FOREIGN BODY FROM EYE: CPT

## 2025-04-28 PROCEDURE — 99284 EMERGENCY DEPT VISIT MOD MDM: CPT

## 2025-04-28 PROCEDURE — 99282 EMERGENCY DEPT VISIT SF MDM: CPT

## 2025-04-28 PROCEDURE — 90715 TDAP VACCINE 7 YRS/> IM: CPT

## 2025-04-28 RX ORDER — ERYTHROMYCIN 5 MG/G
0.5 OINTMENT OPHTHALMIC ONCE
Status: COMPLETED | OUTPATIENT
Start: 2025-04-28 | End: 2025-04-28

## 2025-04-28 RX ORDER — ERYTHROMYCIN 5 MG/G
0.5 OINTMENT OPHTHALMIC EVERY 6 HOURS SCHEDULED
Qty: 1 G | Refills: 0 | Status: SHIPPED | OUTPATIENT
Start: 2025-04-28 | End: 2025-05-03

## 2025-04-28 RX ORDER — TETRACAINE HYDROCHLORIDE 5 MG/ML
2 SOLUTION OPHTHALMIC ONCE
Status: COMPLETED | OUTPATIENT
Start: 2025-04-28 | End: 2025-04-28

## 2025-04-28 RX ADMIN — FLUORESCEIN SODIUM 1 STRIP: 1 STRIP OPHTHALMIC at 19:43

## 2025-04-28 RX ADMIN — TETRACAINE HYDROCHLORIDE 2 DROP: 5 SOLUTION OPHTHALMIC at 19:43

## 2025-04-28 RX ADMIN — ERYTHROMYCIN 0.5 INCH: 5 OINTMENT OPHTHALMIC at 19:42

## 2025-04-28 RX ADMIN — TETANUS TOXOID, REDUCED DIPHTHERIA TOXOID AND ACELLULAR PERTUSSIS VACCINE, ADSORBED 0.5 ML: 5; 2.5; 8; 8; 2.5 SUSPENSION INTRAMUSCULAR at 19:47

## 2025-04-29 NOTE — ED PROVIDER NOTES
Time reflects when diagnosis was documented in both MDM as applicable and the Disposition within this note       Time User Action Codes Description Comment    4/28/2025  8:14 PM Bianka Ray Add [T15.92XA] Foreign body of left eye, initial encounter     4/28/2025  8:14 PM Bianka Ray Add [S05.00XA] Corneal abrasion           ED Disposition       ED Disposition   Discharge    Condition   Stable    Date/Time   Mon Apr 28, 2025  8:14 PM    Comment   Obi Marie discharge to home/self care.                   Assessment & Plan       Medical Decision Making  DDx: corneal abrasion, FB eye  Patient's vision grossly intact. Tdap updated today.   Fluorescence exam revealing corneal abrasion and small speck of dirt in lower canthus. No rust ring on exam. See procedure note. Patient reporting relief. Discussed follow up with ophthalmology. Reviewed antibiotic ointment.   Reviewed reasons to return to ed.  Patient verbalized understanding of diagnosis and agreement with discharge plan of care as well as understanding of reasons to return to ed.        Risk  Prescription drug management.             Medications   tetanus-diphtheria-acellular pertussis (BOOSTRIX) IM injection 0.5 mL (0.5 mL Intramuscular Given 4/28/25 1947)   fluorescein sodium sterile ophthalmic strip 1 strip (1 strip Left Eye Given 4/28/25 1943)   tetracaine 0.5 % ophthalmic solution 2 drop (2 drops Left Eye Given 4/28/25 1943)   erythromycin (ILOTYCIN) 0.5 % ophthalmic ointment 0.5 inch (0.5 inches Left Eye Given 4/28/25 1942)       ED Risk Strat Scores                    No data recorded        SBIRT 20yo+      Flowsheet Row Most Recent Value   Initial Alcohol Screen: US AUDIT-C     1. How often do you have a drink containing alcohol? 0 Filed at: 04/28/2025 1727   2. How many drinks containing alcohol do you have on a typical day you are drinking?  0 Filed at: 04/28/2025 1727   3a. Male UNDER 65: How often do you have five or more drinks on  one occasion? 0 Filed at: 04/28/2025 1727   3b. FEMALE Any Age, or MALE 65+: How often do you have 4 or more drinks on one occassion? 0 Filed at: 04/28/2025 1727   Audit-C Score 0 Filed at: 04/28/2025 1727   JUSTO: How many times in the past year have you...    Used an illegal drug or used a prescription medication for non-medical reasons? Never Filed at: 04/28/2025 1727                            History of Present Illness       Chief Complaint   Patient presents with    Foreign Body in Eye     Pt reports getting either wood or metal in his left eye between yesterday and today. Tried flushing with no relief.        Past Medical History:   Diagnosis Date    Hiatal hernia     Hyperlipidemia     Onychomycosis of toenail       Past Surgical History:   Procedure Laterality Date    BACK SURGERY      spinal fusion    KNEE SURGERY      SC RPR AA HERNIA 1ST < 3 CM REDUCIBLE N/A 12/3/2024    Procedure: REPAIR HERNIA UMBILICAL;  Surgeon: Marcio Treviño MD;  Location:  MAIN OR;  Service: General      Family History   Problem Relation Age of Onset    Thyroid cancer Mother     Lung cancer Mother     Coronary artery disease Father     Hyperlipidemia Sister     Hyperlipidemia Brother     Substance Abuse Neg Hx     Mental illness Neg Hx       Social History     Tobacco Use    Smoking status: Every Day     Current packs/day: 1.00     Average packs/day: 1 pack/day for 15.0 years (15.0 ttl pk-yrs)     Types: Cigarettes    Smokeless tobacco: Never   Vaping Use    Vaping status: Never Used   Substance Use Topics    Alcohol use: Yes     Comment: Rare     Drug use: Yes     Types: Marijuana     Comment: few times week      E-Cigarette/Vaping    E-Cigarette Use Never User       E-Cigarette/Vaping Substances    Nicotine No     THC No     CBD No     Flavoring No     Other No     Unknown No       I have reviewed and agree with the history as documented.     Patient is a 55 yo M arriving for evaluation of left eye irritation following  cleaning piece of equipment. Patient states he was wearing glasses at the time. Patient states flushed eye multiple times. Patient reports vision grossly intact. Patient states occurred yesterday. Patient states has happened before.         Review of Systems   Constitutional: Negative.    HENT: Negative.     Eyes:  Positive for itching. Negative for photophobia, pain, redness and visual disturbance.   Respiratory: Negative.     Cardiovascular: Negative.    Gastrointestinal: Negative.    Endocrine: Negative.    Genitourinary: Negative.    Musculoskeletal: Negative.    Skin: Negative.    Allergic/Immunologic: Negative.    Neurological: Negative.    Hematological: Negative.    Psychiatric/Behavioral: Negative.     All other systems reviewed and are negative.          Objective       ED Triage Vitals [04/28/25 1726]   Temperature Pulse Blood Pressure Respirations SpO2 Patient Position - Orthostatic VS   97.6 °F (36.4 °C) 86 147/88 18 98 % Sitting      Temp Source Heart Rate Source BP Location FiO2 (%) Pain Score    Temporal Monitor Left arm -- --      Vitals      Date and Time Temp Pulse SpO2 Resp BP Pain Score FACES Pain Rating User   04/28/25 1726 97.6 °F (36.4 °C) 86 98 % 18 147/88 -- -- HR            Physical Exam  Vitals and nursing note reviewed.   Constitutional:       Appearance: Normal appearance. He is normal weight.   HENT:      Head: Normocephalic.      Right Ear: External ear normal.      Left Ear: External ear normal.      Nose: Nose normal.      Mouth/Throat:      Mouth: Mucous membranes are moist.      Pharynx: Oropharynx is clear.   Eyes:      General: Lids are normal. Vision grossly intact. No allergic shiner, visual field deficit or scleral icterus.        Right eye: No discharge.         Left eye: Foreign body present.No discharge or hordeolum.      Extraocular Movements: Extraocular movements intact.      Pupils: Pupils are equal, round, and reactive to light.      Left eye: Pupil is round, reactive  and not sluggish. Corneal abrasion and fluorescein uptake present. Zane exam negative.  Cardiovascular:      Rate and Rhythm: Normal rate.      Pulses: Normal pulses.   Pulmonary:      Effort: Pulmonary effort is normal.   Musculoskeletal:      Cervical back: Normal range of motion.   Skin:     General: Skin is warm.      Capillary Refill: Capillary refill takes less than 2 seconds.   Neurological:      General: No focal deficit present.      Mental Status: He is alert.         Results Reviewed       None            No orders to display       Foreign Body - Ocular    Date/Time: 4/28/2025 8:00 AM    Performed by: SLOANE Cardenas  Authorized by: SLOANE Cardenas    Patient location:  ED  Consent:     Consent obtained:  Verbal    Consent given by:  Patient    Risks discussed:  Bleeding, corneal damage, damage to surrounding structures, globe perforation, incomplete removal, infection, pain, visual impairment and worsening of condition    Alternatives discussed:  No treatment  Universal protocol:     Patient identity confirmed:  Verbally with patient  Location:     Location:  L conjunctival    Depth:  Superficial  Pre-procedure details:     Imaging:  None    Correction: uncorrected      Fluorescein exam: yes      Fluorescein uptake: yes      Zane test: negative      Corneal abrasion location:  Lateral and inferior  Anesthesia (see MAR for exact dosages):     Local anesthetic:  Tetracaine drops  Procedure details:     Localization method:  Direct visualization    Removal mechanism:  Moist cotton swab    Foreign bodies recovered:  1    Description:  Small speck    Intact foreign body removal: yes      Residual rust ring observed: no    Post-procedure details:     Confirmation:  No additional foreign bodies on visualization    Dressing:  Antibiotic ointment    Patient tolerance of procedure:  Tolerated well, no immediate complications      ED Medication and Procedure Management   Prior to Admission  Medications   Prescriptions Last Dose Informant Patient Reported? Taking?   Cholecalciferol (VITAMIN D3) 400 units CAPS  Self Yes No   Sig: Take 1 capsule by mouth daily      Facility-Administered Medications: None     Discharge Medication List as of 4/28/2025  8:19 PM        START taking these medications    Details   erythromycin (ILOTYCIN) ophthalmic ointment Administer 0.5 inches into the left eye every 6 (six) hours for 5 days Place a 1/2 inch ribbon of ointment into the lower eyelid., Starting Mon 4/28/2025, Until Sat 5/3/2025, Normal           CONTINUE these medications which have NOT CHANGED    Details   Cholecalciferol (VITAMIN D3) 400 units CAPS Take 1 capsule by mouth daily, Historical Med           No discharge procedures on file.  ED SEPSIS DOCUMENTATION   Time reflects when diagnosis was documented in both MDM as applicable and the Disposition within this note       Time User Action Codes Description Comment    4/28/2025  8:14 PM Binaka Ray [T15.92XA] Foreign body of left eye, initial encounter     4/28/2025  8:14 PM Bianka Ray [S05.00XA] Corneal abrasion                  SLOANE Cardenas  05/01/25 0103

## 2025-06-17 ENCOUNTER — HOSPITAL ENCOUNTER (OUTPATIENT)
Dept: CT IMAGING | Facility: HOSPITAL | Age: 55
Discharge: HOME | End: 2025-06-17
Attending: PHYSICIAN ASSISTANT
Payer: COMMERCIAL

## 2025-06-17 DIAGNOSIS — R91.8 ABNORMAL CT LUNG SCREENING: ICD-10-CM

## 2025-06-17 PROCEDURE — 71250 CT THORAX DX C-: CPT

## 2025-07-10 ENCOUNTER — TELEPHONE (OUTPATIENT)
Age: 55
End: 2025-07-10

## 2025-07-10 NOTE — TELEPHONE ENCOUNTER
Patient call to inform Saima about his left knee has been hurting him. Patient would like to ask if Saima would like for him to go for x-ray and if she like for him to schedule an appointment with Saima. Please reach out to Patient. Thank you

## 2025-07-15 ENCOUNTER — OFFICE VISIT (OUTPATIENT)
Dept: FAMILY MEDICINE CLINIC | Facility: CLINIC | Age: 55
End: 2025-07-15
Payer: COMMERCIAL

## 2025-07-15 VITALS
TEMPERATURE: 97.8 F | DIASTOLIC BLOOD PRESSURE: 78 MMHG | BODY MASS INDEX: 30.66 KG/M2 | RESPIRATION RATE: 15 BRPM | OXYGEN SATURATION: 97 % | HEART RATE: 79 BPM | WEIGHT: 207 LBS | SYSTOLIC BLOOD PRESSURE: 112 MMHG | HEIGHT: 69 IN

## 2025-07-15 DIAGNOSIS — M25.562 ACUTE PAIN OF LEFT KNEE: Primary | ICD-10-CM

## 2025-07-15 PROCEDURE — 99213 OFFICE O/P EST LOW 20 MIN: CPT | Performed by: NURSE PRACTITIONER

## (undated) DEVICE — TUBING SUCTION 5MM X 12 FT

## (undated) DEVICE — ANTIBACTERIAL UNDYED BRAIDED (POLYGLACTIN 910), SYNTHETIC ABSORBABLE SUTURE: Brand: COATED VICRYL

## (undated) DEVICE — GLOVE INDICATOR PI UNDERGLOVE SZ 7.5 BLUE

## (undated) DEVICE — INTENDED FOR TISSUE SEPARATION, AND OTHER PROCEDURES THAT REQUIRE A SHARP SURGICAL BLADE TO PUNCTURE OR CUT.: Brand: BARD-PARKER SAFETY BLADES SIZE 15, STERILE

## (undated) DEVICE — DRAPE EQUIPMENT RF WAND

## (undated) DEVICE — GLOVE SRG BIOGEL 7

## (undated) DEVICE — SUT MONOCRYL 4-0 PS-2 27 IN Y426H

## (undated) DEVICE — NEEDLE HYPO 23G X 1-1/2 IN

## (undated) DEVICE — PAD GROUNDING DUAL ADULT

## (undated) DEVICE — EXOFIN PRECISION PEN HIGH VISCOSITY TOPICAL SKIN ADHESIVE: Brand: EXOFIN PRECISION PEN, 1G

## (undated) DEVICE — MEDI-VAC YANKAUER SUCTION HANDLE W/BULBOUS AND CONTROL VENT: Brand: CARDINAL HEALTH

## (undated) DEVICE — GLOVE SRG BIOGEL 6.5

## (undated) DEVICE — GLOVE SRG BIOGEL ECLIPSE 8

## (undated) DEVICE — GLOVE SRG BIOGEL 8

## (undated) DEVICE — SUT PROLENE 2-0 CT-2 30 IN 8411H

## (undated) DEVICE — GLOVE INDICATOR PI UNDERGLOVE SZ 6.5 BLUE

## (undated) DEVICE — GLOVE INDICATOR PI UNDERGLOVE SZ 8 BLUE

## (undated) DEVICE — GLOVE INDICATOR PI UNDERGLOVE SZ 7 BLUE

## (undated) DEVICE — BETHLEHEM UNIVERSAL MINOR GEN: Brand: CARDINAL HEALTH

## (undated) DEVICE — NEPTUNE E-SEP SMOKE EVACUATION PENCIL, COATED, 70MM BLADE, PUSH BUTTON SWITCH: Brand: NEPTUNE E-SEP

## (undated) DEVICE — ELECTRODE BLADE MOD E-Z CLEAN  2.75IN 7CM -0012AM